# Patient Record
Sex: MALE | Race: WHITE
[De-identification: names, ages, dates, MRNs, and addresses within clinical notes are randomized per-mention and may not be internally consistent; named-entity substitution may affect disease eponyms.]

---

## 2017-04-04 NOTE — EDM.PDOC
ED HPI Trauma





- General


Chief Complaint: Lower Extremity Injury/Pain


Stated Complaint: FOOT


Time Seen by Provider: 04/04/17 22:05


Source: Reports: Patient


History Limitations: Reports: No limitations





- History of Present Illness


INITIAL COMMENTS - FREE TEXT/NARRATIVE: 





This 58 yo male patient reports to the ED with swelling in his left foot. The 

patient reports he has a history of gout and has had similar gout attacks in 

the past. The patient ran out of Indomethacin. The patient reports that he 

currently does not have a primary care provider and is not on any medications 

for managing his gout. The patient reports he recently ate beans, which may 

have precipitated the attack. 


Symptom Onset Date: 04/03/17


Occurred Where: other


Method of Injury: other


Severity: moderate


Pain/Injury Location: Reports: lower extremity, left


Consciousness: Reports: no loss of consciousness


Associated Symptoms: Reports: no other symptoms


Allergies/ADRs: 


Allergies





No Known Allergies Allergy (Verified 04/04/17 21:40)


 








Home Medications: 


Ambulatory Orders





Indomethacin [Indocin] 2 tab PO DAILY 06/24/15 [Confirmed 04/04/17]











Past Medical History





- Past Health History


Medical/Surgical History: Denies Medical/Surgical History


Other Gastrointestinal History: hepatitis C


Musculoskeletal History: Reports: Gout





Social & Family History





- Tobacco Use


Smoking Status *Q: Current Every Day Smoker


Years of Tobacco use: 30


Packs/Tins Daily: 1


Used Tobacco, but Quit: No


Second Hand Smoke Exposure: Yes





- Caffeine Use


Caffeine Use: Reports: Coffee





- Alcohol Use


Days Per Week of Alcohol Use: 7


Number of Drinks Per Day: 2


Total Drinks Per Week: 14





- Recreational Drug Use


Recreational Drug Use: No





Review of Systems





- Review of Systems


Review Of Systems: ROS reveals no pertinent complaints other than HPI.





Trauma Exam





- Physical Exam


Exam: See Below


Exam Limited By: No limitations


General Appearance: Reports: alert, WD/WN


Head: Reports: atraumatic, normocephalic


Eyes: bilateral eye: EOMI, normal inspection, PERRL


Ears: Reports: normal external exam, normal canal, hearing grossly normal, 

normal TMs


Nose: Reports: normal inspection, normal mucousa, no blood


Throat/Mouth: Reports: Normal inspection, Normal lips, Normal teeth, Normal gums

, Normal oropharynx, Normal voice, No airway compromise


Neck: Reports: non-tender, full range of motion, normal alignment, normal 

inspection


Respiratory Exam: Reports: no respiratory distress, lungs clear, normal breath 

sounds


Cardiovascular: Reports: normal peripheral pulses, regular rate, rhythm, no 

edema, no gallop, no JVD, no murmur, no rub


GI/Abdominal: Reports: normal bowel sounds, soft, non tender, no organomegaly, 

no distention, no abnormal bruit, no mass


 (Male) Exam: Deferred


Rectal (Males) Exam: Deferred


Back: Reports: full range of motion, normal inspection, non-tender


Extremities: Reports: pedal edema (left foot and ankle)


Neurologic: Reports: CNs II-XII nml as tested, no motor/sensory deficits, alert

, normal mood/affect, oriented x 3


Skin: Reports: Normal color, Warm/dry





- Ria Coma Score


Best Eye Response (Dix): (4) open spontaneously


Best Verbal Response (Ria): (5) oriented


Best Motor Response (Ria): (6) obeys commands


Dix Total: 15





Course





- Vital Signs


Last Recorded V/S: 


 Last Vital Signs











Temp  36.0 C   04/04/17 21:37


 


Pulse  91   04/04/17 21:37


 


Resp  20   04/04/17 21:37


 


BP  163/91 H  04/04/17 21:37


 


Pulse Ox  100   04/04/17 21:37














- Orders/Labs/Meds


Meds: 


Medications














Discontinued Medications














Generic Name Dose Route Start Last Admin





  Trade Name Jaxson  PRN Reason Stop Dose Admin


 


Indomethacin  50 mg  04/04/17 22:10  





  Indocin  PO  04/04/17 22:11  





  ONETIME ONE   














Departure





- Departure


Time of Disposition: 22:12


Disposition: Home, Self-Care 01


Condition: fair


Clinical Impression: 


Gout attack


Qualifiers:


 Gout site: foot Gout etiology: unspecified cause Laterality: left Qualified 

Code(s): M10.9 - Gout, unspecified





Instructions:  Gout, Easy-to-Read


Forms:  ED Department Discharge


Care Plan Goals: 


The patient was advised of the examination results during the visit. The 

patient was given an oral dose of Indomethacin (50 mg) while in the ED. The 

patient was discharged with a script for Indomethacin (50 mg) #20 to take 1 by 

mouth 3 times per day. The patient should follow-up with a primary care 

facility for continued evaluation and treatment. If the patient has any 

additional symptoms or concerns, the patient should visit his primary care 

facility or return to the emergency department.

## 2017-09-19 NOTE — EDM.PDOC
ED HPI GENERAL MEDICAL PROBLEM





- General


Chief Complaint: Lower Extremity Injury/Pain


Stated Complaint: RT ANKEL/FOOT. BROKE OR GOUT


Time Seen by Provider: 09/19/17 12:50


Source of Information: Reports: Patient


History Limitations: Reports: No Limitations





- History of Present Illness


INITIAL COMMENTS - FREE TEXT/NARRATIVE: 


Patient presents to the ER with complaints of right foot and ankle pain. He 

reports that the pain has been ongoing for the past three days. The pain 

started originally after falling 2-3 feet of a ladder. He reports he fell flat 

onto his heel and heard a "crunch." The pain has since worsened and is much 

more severe this morning. He rates the pain 10/10, described as throbbing and 

stabbing. He reports that the pain is typical to gout attacks in the past. He 

denies any other associated symptoms. Patient states that in the past the only 

thing that has worked for his gout pain is indomethacin.


  ** Right Feet


Pain Score (Numeric/FACES): 10





- Related Data


 Allergies











Allergy/AdvReac Type Severity Reaction Status Date / Time


 


No Known Allergies Allergy   Verified 09/19/17 12:10











Home Meds: 


 Home Meds





Indomethacin [Indocin] 2 tab PO DAILY 06/24/15 [History]











Past Medical History





- Past Health History


Medical/Surgical History: Denies Medical/Surgical History


HEENT History: Reports: Impaired Vision


Other Gastrointestinal History: hepatitis C


Musculoskeletal History: Reports: Gout





Social & Family History





- Family History


Family Medical History: Noncontributory





- Tobacco Use


Smoking Status *Q: Current Every Day Smoker


Years of Tobacco use: 40


Packs/Tins Daily: 0.5


Used Tobacco, but Quit: No


Second Hand Smoke Exposure: Yes





- Caffeine Use


Caffeine Use: Reports: Coffee, Energy Drinks, Soda, Tea





- Alcohol Use


Days Per Week of Alcohol Use: 7


Number of Drinks Per Day: 2


Total Drinks Per Week: 14


Date of Last Drink: 09/18/17





- Recreational Drug Use


Recreational Drug Use: No





Review of Systems





- Review of Systems


Review Of Systems: ROS reveals no pertinent complaints other than HPI.





ED EXAM, GENERAL





- Physical Exam


Exam: See Below


Exam Limited By: Altered Mental Status


General Appearance: Alert, WD/WN, No Apparent Distress


Respiratory/Chest: No Respiratory Distress, Lungs Clear, Normal Breath Sounds, 

No Accessory Muscle Use, Chest Non-Tender


Cardiovascular: Normal Peripheral Pulses, Regular Rate, Rhythm, No Edema, No 

Gallop, No JVD, No Murmur, No Rub


GI/Abdominal: Normal Bowel Sounds, Soft, Non-Tender, No Organomegaly, No 

Distention, No Abnormal Bruit, No Mass


 (Male) Exam: Deferred


Rectal (Males) Exam: Deferred


Back Exam: Normal Inspection, Full Range of Motion, NT


Extremities: Normal Inspection, Normal Range of Motion, Normal Capillary Refill

, Joint Swelling, Redness (redness and swelling to right foot)


Neurological: Alert, Oriented, CN II-XII Intact, Normal Cognition, Normal Gait, 

Normal Reflexes, No Motor/Sensory Deficits


Skin Exam: Warm, Dry, Intact, Normal Color, No Rash


Lymphatic: No Adenopathy





Course





- Vital Signs


Last Recorded V/S: 


 Last Vital Signs











Temp  36.6 C   09/19/17 12:11


 


Pulse  77   09/19/17 12:11


 


Resp  18   09/19/17 12:11


 


BP  107/64   09/19/17 12:11


 


Pulse Ox  99   09/19/17 12:11














- Orders/Labs/Meds


Labs: 


 Laboratory Tests











  09/19/17 09/19/17 Range/Units





  12:55 12:55 


 


WBC  14.2 H   (5.0-10.0)  10^3/uL


 


RBC  4.54 L   (4.6-6.2)  10^6/uL


 


Hgb  15.1   (14.0-18.0)  g/dL


 


Hct  43.5   (40.0-54.0)  %


 


MCV  95.8   ()  fL


 


MCH  33.3   (27.0-34.0)  pg


 


MCHC  34.7   (33.0-35.0)  g/dL


 


Plt Count  274   (150-450)  10^3/uL


 


Neut % (Auto)  66.6   (42.2-75.2)  %


 


Lymph % (Auto)  19.3 L   (20.5-50.1)  %


 


Mono % (Auto)  13.2 H   (2-8)  %


 


Eos % (Auto)  0.5 L   (1.0-3.0)  %


 


Baso % (Auto)  0.4   (0.0-1.0)  %


 


Sodium   136  (135-145)  mmol/L


 


Potassium   4.1  (3.6-5.0)  mmol/L


 


Chloride   99 L  (101-111)  mmol/L


 


Carbon Dioxide   24.0  (21.0-31.0)  mmol/L


 


Anion Gap   17.1  


 


BUN   14  (7-18)  mg/dL


 


Creatinine   0.9  (0.6-1.3)  mg/dL


 


Est Cr Clr Drug Dosing   99.40  mL/min


 


Estimated GFR (MDRD)   > 60  


 


BUN/Creatinine Ratio   15.55  


 


Glucose   125 H  ()  mg/dL


 


Uric Acid   8.0 H  (2.6-7.2)  mg/dL


 


Calcium   9.2  (8.4-10.2)  mg/dl


 


Total Bilirubin   1.2 H  (0.2-1.0)  mg/dL


 


AST   19  (10-42)  IU/L


 


ALT   16  (10-60)  IU/L


 


Alkaline Phosphatase   84  ()  IU/L


 


Total Protein   8.0  (6.7-8.2)  g/dl


 


Albumin   4.3  (3.2-5.5)  g/dl


 


Globulin   3.7  


 


Albumin/Globulin Ratio   1.16  











Meds: 


Medications














Discontinued Medications














Generic Name Dose Route Start Last Admin





  Trade Name Uvaldoq  PRN Reason Stop Dose Admin


 


Indomethacin  50 mg  09/19/17 13:37  09/19/17 13:45





  Indocin  PO  09/19/17 13:38  50 mg





  ONETIME ONE   Administration














Departure





- Departure


Time of Disposition: 15:23


Disposition: Home, Self-Care 01


Condition: Fair


Clinical Impression: 


Gout attack


Qualifiers:


 Gout site: foot Gout etiology: unspecified cause Laterality: left Qualified 

Code(s): M10.9 - Gout, unspecified





Right ankle sprain


Qualifiers:


 Encounter type: initial encounter Involved ligament of ankle: unspecified 

ligament Qualified Code(s): S93.401A - Sprain of unspecified ligament of right 

ankle, initial encounter





Gout


Qualifiers:


 Gout site: foot Gout etiology: unspecified cause Chronicity: acute Laterality: 

right Qualified Code(s): M10.9 - Gout, unspecified








- Discharge Information


Instructions:  Ankle Sprain, Easy-to-Read, Gout


Forms:  ED Department Discharge


Care Plan Goals: 


Patient was given 50 mg indomethacin in the ER with improvement in pain. 

Discussed slightly elevated uric acid level and xray results. Discussed that 

patient likely also sprained of his right ankle. Advised him to rest, ice, and 

elevate it. Patient prescribed 50 mg indomethacin PO TID x 10 days. Instructed 

patient to follow up with his primary care provider or return to the ER if 

symptoms worsen or do not improve.

## 2018-01-19 NOTE — EDM.PDOC
ED HPI GENERAL MEDICAL PROBLEM





- General


Chief Complaint: Lower Extremity Injury/Pain


Stated Complaint: gout [phone]


Time Seen by Provider: 01/19/18 19:11


Source of Information: Reports: Patient


History Limitations: Reports: No Limitations





- History of Present Illness


INITIAL COMMENTS - FREE TEXT/NARRATIVE: 





gives long h/o gout and indocin work good but out of it. flared up 2 days ago 

and getting worse. doesn't have primary care. 


  ** Right Feet


Pain Score (Numeric/FACES): 6





- Related Data


 Allergies











Allergy/AdvReac Type Severity Reaction Status Date / Time


 


No Known Allergies Allergy   Verified 01/19/18 16:54











Home Meds: 


 Home Meds





Indomethacin [Indocin] 2 tab PO DAILY 06/24/15 [History]











Past Medical History





- Past Health History


Medical/Surgical History: Denies Medical/Surgical History


HEENT History: Reports: Impaired Vision


Cardiovascular History: Reports: None


Respiratory History: Reports: None


Gastrointestinal History: Reports: Other (See Below)


Other Gastrointestinal History: hepatitis C


Genitourinary History: Reports: None


Musculoskeletal History: Reports: Gout


Neurological History: Reports: None


Psychiatric History: Reports: None


Endocrine/Metabolic History: Reports: None


Hematologic History: Reports: None


Immunologic History: Reports: None


Oncologic (Cancer) History: Reports: None


Dermatologic History: Reports: None





- Infectious Disease History


Infectious Disease History: Reports: Hepatitis C





- Past Surgical History


Cardiovascular Surgical History: Reports: None


Respiratory Surgical History: Reports: None


GI Surgical History: Reports: None


Male  Surgical History: Reports: None


Endocrine Surgical History: Reports: None


Oncologic Surgical History: Reports: None


Dermatological Surgical History: Reports: None





Social & Family History





- Family History


Family Medical History: Noncontributory





- Tobacco Use


Smoking Status *Q: Current Every Day Smoker


Years of Tobacco use: 40


Packs/Tins Daily: 1


Used Tobacco, but Quit: No


Second Hand Smoke Exposure: Yes





- Caffeine Use


Caffeine Use: Reports: Coffee, Energy Drinks, Soda, Tea





- Alcohol Use


Days Per Week of Alcohol Use: 7


Number of Drinks Per Day: 5


Total Drinks Per Week: 35





- Recreational Drug Use


Recreational Drug Use: No





Review of Systems





- Review of Systems


Review Of Systems: ROS reveals no pertinent complaints other than HPI.





ED EXAM, GENERAL





- Physical Exam


Exam: See Below


Exam Limited By: No Limitations


General Appearance: Alert, WD/WN, Mild Distress, Moderate Distress, Other (foot 

pain)


Ears: Hearing Grossly Normal


Throat/Mouth: Normal Voice, No Airway Compromise


Head: Atraumatic


Neck: Non-Tender, Full Range of Motion


Respiratory/Chest: No Respiratory Distress


Cardiovascular: Regular Rate, Rhythm


GI/Abdominal: Soft, Non-Tender


Extremities: Joint Swelling, Limited Range of Motion, Increased Warmth, Redness

, Other (right foot inflammation, NV wnl, gait limited to pain)


Neurological: Alert, Normal Cognition, No Motor/Sensory Deficits


Psychiatric: Tearful


Skin Exam: Warm, Dry, Normal Color


Lymphatic: No Adenopathy





Course





- Vital Signs


Last Recorded V/S: 


 Last Vital Signs











Temp  36.8 C   01/19/18 18:56


 


Pulse  77   01/19/18 18:56


 


Resp  18   01/19/18 18:56


 


BP  123/81   01/19/18 18:56


 


Pulse Ox  100   01/19/18 18:56














- Orders/Labs/Meds


Meds: 


Medications














Discontinued Medications














Generic Name Dose Route Start Last Admin





  Trade Name Freq  PRN Reason Stop Dose Admin


 


Indomethacin  50 mg  01/19/18 19:10  01/19/18 19:14





  Indocin  PO  01/19/18 19:11  50 mg





  ONETIME ONE   Administration


 


Ketorolac Tromethamine  15 mg  01/19/18 19:31  01/19/18 19:39





  Toradol  IVPUSH  01/19/18 19:32  15 mg





  ONETIME ONE   Administration














- Re-Assessments/Exams


Free Text/Narrative Re-Assessment/Exam: 





01/19/18 19:50


s/p IV toradol = much better but not 100% yet.





Departure





- Departure


Time of Disposition: 19:51


Disposition: Home, Self-Care 01


Condition: Good


Clinical Impression: 


Acute gout


Qualifiers:


 Gout site: foot Gout etiology: unspecified cause Laterality: right Qualified 

Code(s): M10.9 - Gout, unspecified








- Discharge Information


Instructions:  Gout, Easy-to-Read


Forms:  ED Department Discharge


Additional Instructions: 


1) elevate leg as much as possible next 48 hours


2) try hot soaks


3) recheck as needed





rx given;


indocin 50mg bid x 60

## 2018-09-23 NOTE — EDM.PDOC
ED HPI GENERAL MEDICAL PROBLEM





- General


Chief Complaint: Lower Extremity Injury/Pain


Stated Complaint: LEFT FOOT, GOUT, NO MEDS


Time Seen by Provider: 09/23/18 16:51


Source of Information: Reports: Patient, RN, RN Notes Reviewed


History Limitations: Reports: No Limitations





- History of Present Illness


INITIAL COMMENTS - FREE TEXT/NARRATIVE: 


Patient presented to ER with complaint of gout flare up. This began yesterday 

a.m. on his left foot. Pain is 9/10. 


Onset Date: 09/22/18


Duration: Getting Worse


Location: Reports: Lower Extremity, Left


Quality: Reports: Ache


Severity: Moderate


Improves with: Reports: None


Worsens with: Reports: None


Associated Symptoms: Reports: No Other Symptoms


  ** Left Feet


Pain Score (Numeric/FACES): 9





- Related Data


 Allergies











Allergy/AdvReac Type Severity Reaction Status Date / Time


 


No Known Allergies Allergy   Verified 09/23/18 15:36











Home Meds: 


 Home Meds





Indomethacin [Indocin] 2 tab PO DAILY 06/24/15 [History]











Past Medical History





- Past Health History


Medical/Surgical History: Denies Medical/Surgical History


HEENT History: Reports: Impaired Vision


Cardiovascular History: Reports: None


Respiratory History: Reports: None


Gastrointestinal History: Reports: Other (See Below)


Other Gastrointestinal History: hepatitis C


Genitourinary History: Reports: None


Musculoskeletal History: Reports: Arthritis, Back Pain, Chronic, Gout


Neurological History: Reports: None


Psychiatric History: Reports: None


Endocrine/Metabolic History: Reports: None


Hematologic History: Reports: None


Immunologic History: Reports: None


Oncologic (Cancer) History: Reports: None


Dermatologic History: Reports: None





- Infectious Disease History


Infectious Disease History: Reports: Hepatitis C





- Past Surgical History


Cardiovascular Surgical History: Reports: None


Respiratory Surgical History: Reports: None


GI Surgical History: Reports: None


Male  Surgical History: Reports: None


Endocrine Surgical History: Reports: None


Oncologic Surgical History: Reports: None


Dermatological Surgical History: Reports: None





Social & Family History





- Family History


Family Medical History: Noncontributory





- Tobacco Use


Smoking Status *Q: Current Every Day Smoker


Years of Tobacco use: 40


Packs/Tins Daily: 1


Second Hand Smoke Exposure: Yes





- Caffeine Use


Caffeine Use: Reports: Coffee, Energy Drinks, Soda, Tea





- Recreational Drug Use


Recreational Drug Use: No





Review of Systems





- Review of Systems


Review Of Systems: ROS reveals no pertinent complaints other than HPI.





ED EXAM, GENERAL





- Physical Exam


Exam: See Below


Exam Limited By: No Limitations


General Appearance: Alert, WD/WN, No Apparent Distress


Eye Exam: Bilateral Eye: Normal Inspection


Ears: Normal External Exam, Normal Canal, Hearing Grossly Normal, Normal TMs


Nose: Normal Inspection, Normal Mucosa, No Blood


Throat/Mouth: Normal Inspection, Normal Lips, Normal Teeth, Normal Gums, Normal 

Oropharynx, Normal Voice, No Airway Compromise


Head: Atraumatic, Normocephalic


Neck: Normal Inspection, Supple, Non-Tender, Full Range of Motion


Respiratory/Chest: Rhonchi (throughout)


Cardiovascular: Normal Peripheral Pulses, Regular Rate, Rhythm, No Edema, No 

Gallop, No JVD, No Murmur, No Rub


GI/Abdominal: Normal Bowel Sounds, Soft, Non-Tender, No Organomegaly, No 

Distention, No Abnormal Bruit, No Mass


 (Male) Exam: Deferred


Rectal (Males) Exam: Deferred


Back Exam: Normal Inspection, Full Range of Motion, NT


Extremities: Other (left foot decreased range of motion, tender and 

erythematous.)


Neurological: Alert, Oriented, CN II-XII Intact, Normal Cognition, Normal Gait, 

Normal Reflexes, No Motor/Sensory Deficits


Psychiatric: Normal Affect, Normal Mood


Skin Exam: Warm, Dry, Intact, Normal Color, No Rash


Lymphatic: No Adenopathy





Course





- Vital Signs


Last Recorded V/S: 


 Last Vital Signs











Temp  96.8 F   09/23/18 15:33


 


Pulse  100   09/23/18 15:33


 


Resp  18   09/23/18 15:33


 


BP  129/98 H  09/23/18 15:33


 


Pulse Ox  100   09/23/18 15:33














- Orders/Labs/Meds


Labs: 


 Laboratory Tests











  09/23/18 09/23/18 Range/Units





  15:50 15:50 


 


WBC  13.4 H   (5.0-10.0)  10^3/uL


 


RBC  4.76   (4.6-6.2)  10^6/uL


 


Hgb  15.6   (14.0-18.0)  g/dL


 


Hct  45.3   (40.0-54.0)  %


 


MCV  95.2   ()  fL


 


MCH  32.8   (27.0-34.0)  pg


 


MCHC  34.4   (33.0-35.0)  g/dL


 


Plt Count  249   (150-450)  10^3/uL


 


Neut % (Auto)  64.7   (42.2-75.2)  %


 


Lymph % (Auto)  23.5   (20.5-50.1)  %


 


Mono % (Auto)  11.2 H   (2-8)  %


 


Eos % (Auto)  0.3 L   (1.0-3.0)  %


 


Baso % (Auto)  0.3   (0.0-1.0)  %


 


Uric Acid   8.5 H  (2.6-7.2)  mg/dL











Meds: 


Medications














Discontinued Medications














Generic Name Dose Route Start Last Admin





  Trade Name Freq  PRN Reason Stop Dose Admin


 


Colchicine  1.8 mg  09/23/18 16:56  09/23/18 17:03





  Colcrys  PO  09/23/18 16:57  1.8 mg





  ONETIME ONE   Administration





     





     





     





     


 


Ibuprofen  800 mg  09/23/18 16:56  09/23/18 17:03





  Motrin  PO  09/23/18 16:57  800 mg





  ONETIME ONE   Administration





     





     





     





     


 


Prednisone  40 mg  09/23/18 16:57  09/23/18 17:03





  Prednisone  PO  09/23/18 16:58  40 mg





  ONETIME ONE   Administration





     





     





     





     














Departure





- Departure


Time of Disposition: 17:02


Disposition: Home, Self-Care 01


Condition: Fair


Clinical Impression: 


Gout attack


Qualifiers:


 Gout site: foot Gout etiology: unspecified cause Laterality: right Qualified 

Code(s): M10.9 - Gout, unspecified








- Discharge Information


*PRESCRIPTION DRUG MONITORING PROGRAM REVIEWED*: No


*COPY OF PRESCRIPTION DRUG MONITORING REPORT IN PATIENT PITO: No


Instructions:  Gout, Easy-to-Read


Forms:  ED Department Discharge


Additional Instructions: 


Continue using ibuprofen as directed for pain


RX: Prednisone


Follow up with your primary care facility

## 2019-01-14 NOTE — EDM.PDOC
ED HPI GENERAL MEDICAL PROBLEM





- General


Chief Complaint: Lower Extremity Injury/Pain


Stated Complaint: GAL BLADDER FLARE UP


Time Seen by Provider: 01/14/19 10:33


Source of Information: Reports: Patient


History Limitations: Reports: No Limitations





- History of Present Illness


INITIAL COMMENTS - FREE TEXT/NARRATIVE: 





Patient comes emergency department today with complaints of bilateral foot pain 

and a gout flareup. Patient has had long-standing problems with gout. He has 

been out of his allopurinol as well as his indomethacin for quite some time. He 

does have a primary care provider but he has not seen them in the last month 

when his gout is gotten much worse. He has not been on any prednisone for about 

6 months and that was for his hip. He is unsure of how his kidney function is 

or if he is a diabetic. No recent injury to his lower extremities. His left 

foot is much worse than his right. It is very hypersensitive and swollen. It is 

difficult for him to put his shoes on.


  ** Bilateral Lower Ankle


Pain Score (Numeric/FACES): 8





- Related Data


 Allergies











Allergy/AdvReac Type Severity Reaction Status Date / Time


 


No Known Allergies Allergy   Verified 01/14/19 10:11











Home Meds: 


 Home Meds





Allopurinol [Zyloprim] 100 mg PO DAILY #30 tab 01/14/19 [Rx]


Indomethacin [Indocin] 1 tab PO TID PRN #25 cap 01/14/19 [Rx]


Omeprazole 20 mg PO DAILY #30 tab. 01/14/19 [Rx]


predniSONE 40 mg PO .Daily Taper #5 tab 01/14/19 [Rx]











Past Medical History





- Past Health History


Medical/Surgical History: Denies Medical/Surgical History


HEENT History: Reports: Impaired Vision


Cardiovascular History: Reports: None


Respiratory History: Reports: None


Gastrointestinal History: Reports: Other (See Below)


Other Gastrointestinal History: hepatitis C


Genitourinary History: Reports: None


Musculoskeletal History: Reports: Arthritis, Back Pain, Chronic, Gout


Neurological History: Reports: None


Psychiatric History: Reports: None


Endocrine/Metabolic History: Reports: None


Hematologic History: Reports: None


Immunologic History: Reports: None


Oncologic (Cancer) History: Reports: None


Dermatologic History: Reports: None





- Infectious Disease History


Infectious Disease History: Reports: Hepatitis C





- Past Surgical History


Head Surgeries/Procedures: Reports: None


Cardiovascular Surgical History: Reports: None


Respiratory Surgical History: Reports: None


GI Surgical History: Reports: None


Male  Surgical History: Reports: None


Endocrine Surgical History: Reports: None


Oncologic Surgical History: Reports: None


Dermatological Surgical History: Reports: None





Social & Family History





- Family History


Family Medical History: Noncontributory





- Tobacco Use


Smoking Status *Q: Current Every Day Smoker


Years of Tobacco use: 50


Packs/Tins Daily: 0.7





- Caffeine Use


Caffeine Use: Reports: Coffee





- Alcohol Use


Days Per Week of Alcohol Use: 2


Number of Drinks Per Day: 6


Total Drinks Per Week: 12





- Recreational Drug Use


Recreational Drug Use: No





Review of Systems





- Review of Systems


Review Of Systems: ROS reveals no pertinent complaints other than HPI.





ED EXAM, GENERAL





- Physical Exam


Exam: See Below


Free Text/Narrative:: 





He does appear quite uncomfortable in pain.


General Appearance: Alert


Respiratory/Chest: No Respiratory Distress, Lungs Clear


Cardiovascular: Normal Peripheral Pulses, Regular Rate, Rhythm


Peripheral Pulses: 2+: Popliteal (L), Popliteal (R), Posterior Tibial (L), 

Posterior Tibial (R), Dorsalis Pedis (L), Dorsalis Pedis (R)


GI/Abdominal: Normal Bowel Sounds, Soft


Extremities: Normal Range of Motion, Normal Capillary Refill, Other (He does 

have some mild swelling to the left base of the big toe as well as some mild 

erythema the swelling extends throughout most of the dorsum of the foot. No 

acute bony abnormality or break in the skin. Similarly on the right foot he has 

some generalized dorsal foot swelling at the base of the right big toe not as 

impressive as the left. No breaks in the skin. He is quite hypersensitive 

throughout the entirety of bilateral lower feet.)


Neurological: Alert, Oriented


Psychiatric: Normal Affect, Normal Mood


Skin Exam: Warm, Dry, Intact, Other (As above)





Course





- Vital Signs


Last Recorded V/S: 


 Last Vital Signs











Temp  36.6 C   01/14/19 10:11


 


Pulse  77   01/14/19 10:11


 


Resp  18   01/14/19 10:11


 


BP  119/80   01/14/19 10:11


 


Pulse Ox  98   01/14/19 10:11














- Orders/Labs/Meds


Labs: 


 Laboratory Tests











  01/14/19 Range/Units





  10:46 


 


Sodium  135  (135-145)  mmol/L


 


Potassium  4.0  (3.6-5.0)  mmol/L


 


Chloride  99 L  (101-111)  mmol/L


 


Carbon Dioxide  25.0  (21.0-31.0)  mmol/L


 


Anion Gap  15.0  


 


BUN  10  (7-18)  mg/dL


 


Creatinine  0.8  (0.6-1.3)  mg/dL


 


Est Cr Clr Drug Dosing  105.89  mL/min


 


Estimated GFR (MDRD)  > 60  


 


Glucose  109 H  ()  mg/dL


 


Uric Acid  6.3  (2.6-7.2)  mg/dL


 


Calcium  9.1  (8.4-10.2)  mg/dl











Meds: 


Medications














Discontinued Medications














Generic Name Dose Route Start Last Admin





  Trade Name Freq  PRN Reason Stop Dose Admin


 


Hydrocodone Bitart/Acetaminophen  1 tab  01/14/19 10:37  01/14/19 11:10





  Norco 325-5 Mg  PO  01/14/19 10:38  Not Given





  ONETIME ONE   





     





     





     





     


 


Hydrocodone Bitart/Acetaminophen  1 tab  01/14/19 10:38  01/14/19 10:49





  Norco 325-10 Mg  PO  01/14/19 10:39  1 tab





  ONETIME ONE   Administration





     





     





     





     


 


Indomethacin  50 mg  01/14/19 10:40  01/14/19 10:49





  Indocin  PO  01/14/19 10:41  50 mg





  ONETIME ONE   Administration





     





     





     





     














- Re-Assessments/Exams


Free Text/Narrative Re-Assessment/Exam: 





01/14/19 11:45


He was given hydrocodone as well as indomethacin while in the emergency parent.





His laboratory evaluation is rather unremarkable he is unsure of what his 

kidney status was her diabetes status. His uric acid level was normal





I will send him home with some prednisone indomethacin allopurinol hydrocodone 

will also give some omeprazole to protect his stomach but this aggressive 

therapy as above. I did discuss his chronic management things need to be taken 

care of in the primary care clinic. He is understanding of this and his 

questions are answered.





Departure





- Departure


Time of Disposition: 11:26


Disposition: Home, Self-Care 01


Clinical Impression: 


Gout attack


Qualifiers:


 Gout site: foot Gout etiology: unspecified cause Laterality: left Qualified 

Code(s): M10.9 - Gout, unspecified








- Discharge Information


Prescriptions: 


Allopurinol [Zyloprim] 100 mg PO DAILY #30 tab


Indomethacin [Indocin] 1 tab PO TID PRN #25 cap


 PRN Reason: Other


Omeprazole 20 mg PO DAILY #30 tab.rap


predniSONE 40 mg PO .Daily Taper #5 tab


Instructions:  Gout, Easy-to-Read, Pain Medicine Instructions, Easy-to-Read


Forms:  ED Department Discharge


Additional Instructions: 


Idomethacin, 1 tablet three times a day as needed for pain. Do not take with 

other NSAIDs Ibuprofen Aleve Motrin etc. RX given to the patient. 


Prednisone 40mg a day for the next 5 days. RX given to the patient. 


Allopurinol 100mg a day. RX given to the patient. 


make sure you take the above medications on a full stomach. 


You must get your long term medications out of the clinic. 


Please see your PCP in the next week for long term management of your chronic 

gout. 


If pain not controlled with above Norco 1 tablet every 4 hrs as needed for 

pain. RX given to the patient. 


Return to the ED if new or worsening diet. 


Limit foods high in purine. 


Lots of fluids over the next few days. 


Omeprazole daily to protect your stomach with all the above medications. 





- Assessment/Plan


Assessment:: 





Acute on chronic gouty arthritis bilaterally. 


Plan: 





Idomethacin, 1 tablet three times a day as needed for pain. Do not take with 

other NSAIDs Ibuprofen Aleve Motrin etc. RX given to the patient. 


Prednisone 40mg a day for the next 5 days. RX given to the patient. 


Allopurinol 100mg a day. RX given to the patient. 


make sure you take the above medications on a full stomach. 


You must get your long term medications out of the clinic. 


Please see your PCP in the next week for long term management of your chronic 

gout. 


If pain not controlled with above Norco 1 tablet every 4 hrs as needed for 

pain. RX given to the patient. 


Return to the ED if new or worsening diet. 


Limit foods high in purine. 


Lots of fluids over the next few days. 


Omeprazole daily to protect the stomach as well.

## 2020-10-07 ENCOUNTER — HOSPITAL ENCOUNTER (EMERGENCY)
Dept: HOSPITAL 43 - DL.ED | Age: 60
Discharge: HOME | End: 2020-10-07
Payer: COMMERCIAL

## 2020-10-07 VITALS — HEART RATE: 89 BPM | DIASTOLIC BLOOD PRESSURE: 78 MMHG | SYSTOLIC BLOOD PRESSURE: 165 MMHG

## 2020-10-07 DIAGNOSIS — Z79.899: ICD-10-CM

## 2020-10-07 DIAGNOSIS — M10.061: Primary | ICD-10-CM

## 2020-10-07 LAB
ANION GAP SERPL CALC-SCNC: 20.5 MEQ/L (ref 7–13)
CHLORIDE SERPL-SCNC: 97 MMOL/L (ref 98–107)
SODIUM SERPL-SCNC: 136 MMOL/L (ref 136–145)

## 2020-10-07 PROCEDURE — 85025 COMPLETE CBC W/AUTO DIFF WBC: CPT

## 2020-10-07 PROCEDURE — 84550 ASSAY OF BLOOD/URIC ACID: CPT

## 2020-10-07 PROCEDURE — 86140 C-REACTIVE PROTEIN: CPT

## 2020-10-07 PROCEDURE — 80053 COMPREHEN METABOLIC PANEL: CPT

## 2020-10-07 PROCEDURE — 96375 TX/PRO/DX INJ NEW DRUG ADDON: CPT

## 2020-10-07 PROCEDURE — 96361 HYDRATE IV INFUSION ADD-ON: CPT

## 2020-10-07 PROCEDURE — 96374 THER/PROPH/DIAG INJ IV PUSH: CPT

## 2020-10-07 PROCEDURE — 36415 COLL VENOUS BLD VENIPUNCTURE: CPT

## 2020-10-07 PROCEDURE — 99283 EMERGENCY DEPT VISIT LOW MDM: CPT

## 2020-10-07 PROCEDURE — 85379 FIBRIN DEGRADATION QUANT: CPT

## 2020-10-07 NOTE — EDM.PDOC
ED HPI GENERAL MEDICAL PROBLEM





- General


Chief Complaint: Lower Extremity Injury/Pain


Stated Complaint: RIGHT KNEE SWELLING AFFECTING MOBILITY


Time Seen by Provider: 10/07/20 10:43


Source of Information: Reports: Patient, Old Records, RN, RN Notes Reviewed


History Limitations: Reports: No Limitations





- History of Present Illness


INITIAL COMMENTS - FREE TEXT/NARRATIVE: 


Pt to ER, unable to bear weight on right knee. Started Saturday (4 days ago) 

with a right ankle gout flare, which has since resolved leaving some fading 

ecchymosis to the right foot. Monday morning (3 days ago) he awoke with severe 

right knee pain. The joint is edematous but not ecchymotic or erythematous. He 

denies any fever or chills. States that he has been taking extra Allopurinol, as

he usually takes Indomethacin for attacks but did not have any.





Onset: Gradual


Duration: Constant, Getting Worse


Location: Reports: Lower Extremity, Right


Quality: Reports: Ache, Same as Previous Episode, Sharp, Throbbing


Severity: Severe


Improves with: Reports: Immobilization


Worsens with: Reports: Movement


Associated Symptoms: Reports: No Other Symptoms


  ** Right Knee


Pain Score (Numeric/FACES): 8





- Related Data


                                    Allergies











Allergy/AdvReac Type Severity Reaction Status Date / Time


 


No Known Allergies Allergy   Verified 10/07/20 10:50











Home Meds: 


                                    Home Meds





Indomethacin [Indocin] 1 tab PO TID PRN #25 cap 01/14/19 [Rx]


Omeprazole 20 mg PO DAILY #30 tab. 01/14/19 [Rx]


allopurinoL [Zyloprim] 100 mg PO DAILY #30 tab 01/14/19 [Rx]


Metoprolol Tartrate [Lopressor] 12.5 mg PO BID 10/07/20 [History]











Past Medical History





- Past Health History


Medical/Surgical History: Denies Medical/Surgical History


HEENT History: Reports: Impaired Vision


Cardiovascular History: Reports: None


Respiratory History: Reports: None


Gastrointestinal History: Reports: Other (See Below)


Other Gastrointestinal History: hepatitis C


Genitourinary History: Reports: None


Musculoskeletal History: Reports: Arthritis, Back Pain, Chronic, Gout


Neurological History: Reports: None


Psychiatric History: Reports: None


Endocrine/Metabolic History: Reports: None


Hematologic History: Reports: None


Immunologic History: Reports: None


Oncologic (Cancer) History: Reports: None


Dermatologic History: Reports: None





- Infectious Disease History


Infectious Disease History: Reports: Hepatitis C





- Past Surgical History


Head Surgeries/Procedures: Reports: None


Cardiovascular Surgical History: Reports: None


Respiratory Surgical History: Reports: None


GI Surgical History: Reports: None


Male  Surgical History: Reports: None


Endocrine Surgical History: Reports: None


Oncologic Surgical History: Reports: None


Dermatological Surgical History: Reports: None





Social & Family History





- Family History


Family Medical History: Noncontributory





- Caffeine Use


Caffeine Use: Reports: Coffee





- Living Situation & Occupation


Living situation: Reports: with Family


Occupation: Disabled





Review of Systems





- Review of Systems


Review Of Systems: Comprehensive ROS is negative, except as noted in HPI.





ED EXAM, GENERAL





- Physical Exam


Exam: See Below


Exam Limited By: No Limitations


General Appearance: Alert, WD/WN, No Apparent Distress


Eye Exam: Bilateral Eye: Normal Inspection


Nose: Normal Inspection


Throat/Mouth: Normal Inspection, Normal Lips, Normal Voice, No Airway Compromise


Head: Atraumatic, Normocephalic


Neck: Normal Inspection


Respiratory/Chest: No Respiratory Distress


Cardiovascular: Normal Peripheral Pulses


GI/Abdominal: Normal Bowel Sounds, Soft, Non-Tender


Back Exam: Normal Inspection


Extremities: No Pedal Edema, Joint Swelling (Right knee with generalized 

tenderness, increased warmth, but no erythema, anterior/patellar bursa swelling 

and joint effusion), Other (Left knee and all other joints normal to exam).  No:

 Petrona's Sign, Mottled, Redness


Neurological: Alert, Oriented, No Motor/Sensory Deficits


Psychiatric: Normal Affect, Normal Mood


Skin Exam: Warm, Dry, Intact, Normal Color, No Rash





Course





- Vital Signs


Last Recorded V/S: 


                                Last Vital Signs











Temp  97.8 F   10/07/20 10:44


 


Pulse  89   10/07/20 10:44


 


Resp  16   10/07/20 10:44


 


BP  165/78 H  10/07/20 10:44


 


Pulse Ox  98   10/07/20 10:44














- Orders/Labs/Meds


Orders: 


                               Active Orders 24 hr











 Category Date Time Status


 


 Peripheral IV Care [RC] .AS DIRECTED Care  10/07/20 11:13 Active


 


 Sodium Chloride 0.9% [Normal Saline] 1,000 ml Med  10/07/20 11:13 Active





 IV .BOLUS   


 


 Sodium Chloride 0.9% [Saline Flush] Med  10/07/20 11:12 Active





 10 ml FLUSH ASDIRECTED PRN   


 


 Peripheral IV Insertion Adult [OM.PC] Stat Oth  10/07/20 11:12 Ordered








                                Medication Orders





Sodium Chloride (Normal Saline)  1,000 mls @ 999 mls/hr IV .BOLUS ONE


   Stop: 10/07/20 12:13


   Last Admin: 10/07/20 11:32  Dose: 999 mls/hr


   Documented by: ARUN


Sodium Chloride (Saline Flush)  10 ml FLUSH ASDIRECTED PRN


   PRN Reason: Keep Vein Open


   Last Admin: 10/07/20 11:36  Dose: 10 ml


   Documented by: ARUN








Labs: 


                                Laboratory Tests











  10/07/20 10/07/20 10/07/20 Range/Units





  10:49 10:49 10:49 


 


WBC  11.5 H    (5.0-10.0)  10^3/uL


 


RBC  4.40 L    (4.6-6.2)  10^6/uL


 


Hgb  15.0    (14.0-18.0)  g/dL


 


Hct  43.2    (40.0-54.0)  %


 


MCV  98.2  D    ()  fL


 


MCH  34.1 H    (27.0-34.0)  pg


 


MCHC  34.7    (33.0-35.0)  g/dL


 


Plt Count  234    (150-450)  10^3/uL


 


Neut % (Auto)  70.5    (42.2-75.2)  %


 


Lymph % (Auto)  17.6 L    (20.5-50.1)  %


 


Mono % (Auto)  10.5 H    (2-8)  %


 


Eos % (Auto)  0.4 L    (1.0-3.0)  %


 


Baso % (Auto)  1.0    (0.0-1.0)  %


 


Add Manual Diff  Yes    


 


Neutrophils % (Manual)  72    (42-75)  %


 


Band Neutrophils %  3    %


 


Lymphocytes % (Manual)  18 L    (20-50)  %


 


Monocytes % (Manual)  7    (2-8)  %


 


D-Dimer, Quantitative   751 H   (0-400)  ng/mL


 


Sodium    136  (136-145)  mmol/L


 


Potassium    3.5  (3.5-5.1)  mmol/L


 


Chloride    97 L  ()  mmol/L


 


Carbon Dioxide    22  (21-32)  mmol/L


 


Anion Gap    20.5 H  (7-13)  mEq/L


 


BUN    14  (7-18)  mg/dL


 


Creatinine    1.17  (0.70-1.30)  mg/dL


 


Est Cr Clr Drug Dosing    73.69  mL/min


 


Estimated GFR (MDRD)    > 60  


 


BUN/Creatinine Ratio    12.0  (No establ ref range)  


 


Glucose    115 H  (74-99)  mg/dL


 


Uric Acid    5.6  (3.5-7.2)  mg/dL


 


Calcium    8.9  (8.5-10.1)  mg/dL


 


Total Bilirubin    1.1 H  (0.2-1.0)  mg/dL


 


AST    32  (15-37)  U/L


 


ALT    34  (16-63)  U/L


 


Alkaline Phosphatase    92  ()  U/L


 


C-Reactive Protein    21.9 H  (0.0-0.9)  mg/dL


 


Total Protein    8.3 H  (6.4-8.2)  g/dL


 


Albumin    3.3 L  (3.4-5.0)  g/dL


 


Globulin    5.0  


 


Albumin/Globulin Ratio    0.66  











Meds: 


Medications











Generic Name Dose Route Start Last Admin





  Trade Name Freq  PRN Reason Stop Dose Admin


 


Sodium Chloride  1,000 mls @ 999 mls/hr  10/07/20 11:13  10/07/20 11:32





  Normal Saline  IV  10/07/20 12:13  999 mls/hr





  .BOLUS ONE   Administration


 


Sodium Chloride  10 ml  10/07/20 11:12  10/07/20 11:36





  Saline Flush  FLUSH   10 ml





  ASDIRECTED PRN   Administration





  Keep Vein Open  














Discontinued Medications














Generic Name Dose Route Start Last Admin





  Trade Name Freq  PRN Reason Stop Dose Admin


 


Colchicine  1.2 mg  10/07/20 11:13  10/07/20 11:35





  Colcrys  PO  10/07/20 11:14  1.2 mg





  ONETIME ONE   Administration


 


Hydromorphone HCl  1 mg  10/07/20 11:12  10/07/20 11:34





  Dilaudid  IVPUSH  10/07/20 11:13  1 mg





  ONETIME ONE   Administration


 


Ketorolac Tromethamine  30 mg  10/07/20 11:12  10/07/20 11:36





  Toradol  IVPUSH  10/07/20 11:13  30 mg





  ONETIME ONE   Administration


 


Methylprednisolone Sodium Succinate  125 mg  10/07/20 11:12  10/07/20 11:37





  Solu-Medrol  IVPUSH  10/07/20 11:13  125 mg





  ONETIME ONE   Administration


 


Ondansetron HCl  4 mg  10/07/20 11:13  10/07/20 11:32





  Zofran  IV  10/07/20 11:14  4 mg





  ONETIME ONE   Administration














Departure





- Departure


Time of Disposition: 12:08


Disposition: Home, Self-Care 01


Condition: Good


Clinical Impression: 


Gout of right knee


Qualifiers:


 Gout etiology: idiopathic Chronicity: acute Qualified Code(s): M10.061 - 

Idiopathic gout, right knee








- Discharge Information


*PRESCRIPTION DRUG MONITORING PROGRAM REVIEWED*: No


*COPY OF PRESCRIPTION DRUG MONITORING REPORT IN PATIENT PITO: No


Instructions:  Low-Purine Eating Plan


Forms:  ED Department Discharge


Additional Instructions: 


Rx: Prednisone 20mg


Rx: Indomethacin 50mg


Rx: Colchicine 0.6mg


Rx: Norco 5mg/325mg


Moist hot packs to right knee.


Use crutches as needed for comfort.


Follow up in clinic next week.





Sepsis Event Note (ED)





- Focused Exam


Vital Signs: 


                                   Vital Signs











  Temp Pulse Resp BP Pulse Ox


 


 10/07/20 10:44  97.8 F  89  16  165/78 H  98














- My Orders


Last 24 Hours: 


My Active Orders





10/07/20 11:12


Sodium Chloride 0.9% [Saline Flush]   10 ml FLUSH ASDIRECTED PRN 


Peripheral IV Insertion Adult [OM.PC] Stat 





10/07/20 11:13


Peripheral IV Care [RC] .AS DIRECTED 


Sodium Chloride 0.9% [Normal Saline] 1,000 ml IV .BOLUS 














- Assessment/Plan


Last 24 Hours: 


My Active Orders





10/07/20 11:12


Sodium Chloride 0.9% [Saline Flush]   10 ml FLUSH ASDIRECTED PRN 


Peripheral IV Insertion Adult [OM.PC] Stat 





10/07/20 11:13


Peripheral IV Care [RC] .AS DIRECTED 


Sodium Chloride 0.9% [Normal Saline] 1,000 ml IV .BOLUS

## 2021-06-01 ENCOUNTER — HOSPITAL ENCOUNTER (EMERGENCY)
Dept: HOSPITAL 43 - DL.ED | Age: 61
Discharge: HOME | End: 2021-06-01
Payer: COMMERCIAL

## 2021-06-01 VITALS — SYSTOLIC BLOOD PRESSURE: 163 MMHG | DIASTOLIC BLOOD PRESSURE: 92 MMHG | HEART RATE: 112 BPM

## 2021-06-01 DIAGNOSIS — M10.9: Primary | ICD-10-CM

## 2021-06-01 DIAGNOSIS — Z79.899: ICD-10-CM

## 2021-06-01 DIAGNOSIS — Z72.0: ICD-10-CM

## 2021-06-01 LAB
ANION GAP SERPL CALC-SCNC: 19.5 MEQ/L (ref 7–13)
CHLORIDE SERPL-SCNC: 99 MMOL/L (ref 98–107)
SODIUM SERPL-SCNC: 136 MMOL/L (ref 136–145)

## 2021-06-01 NOTE — EDM.PDOC
ED HPI GENERAL MEDICAL PROBLEM





- General


Chief Complaint: Upper Extremity Injury/Pain


Stated Complaint: INFECTION IN ELBOW


Time Seen by Provider: 06/01/21 07:01


Source of Information: Reports: Patient, Old Records, RN, RN Notes Reviewed


History Limitations: Reports: No Limitations





- History of Present Illness


INITIAL COMMENTS - FREE TEXT/NARRATIVE: 


Pt presents to ER from home by POV with c/o three days duration of worsening 

left posterior elbow pain, swelling, and redness with increased warmth. Denie 

injury, fever, chills, or overuse. He has history of elbow bursitis, but it has 

never hurt this bad. He has Hx of gout, and takes allopurinol daily. Pt states 

even his shirt touching his elbow hurts. He is nauseated from the pain. Denies 

COVID symptoms or exposure. 





Onset: Gradual


Duration: Day(s): (3), Constant, Getting Worse


Location: Reports: Upper Extremity, Left


Quality: Reports: Ache, Throbbing


Severity: Severe


Improves with: Reports: None


Worsens with: Reports: Other (Touch/Palpation), Movement


Associated Symptoms: Reports: No Other Symptoms


  ** Left Elbow


Pain Score (Numeric/FACES): 5





- Related Data


                                    Allergies











Allergy/AdvReac Type Severity Reaction Status Date / Time


 


No Known Allergies Allergy   Verified 06/01/21 06:48











Home Meds: 


                                    Home Meds





allopurinoL [Zyloprim] 100 mg PO DAILY #30 tab 01/14/19 [Rx]











Past Medical History





- Past Health History


Medical/Surgical History: Denies Medical/Surgical History


HEENT History: Reports: Impaired Vision


Cardiovascular History: Reports: None


Respiratory History: Reports: None


Gastrointestinal History: Reports: Other (See Below)


Other Gastrointestinal History: hepatitis C


Genitourinary History: Reports: None


Musculoskeletal History: Reports: Arthritis, Back Pain, Chronic, Gout


Neurological History: Reports: None


Psychiatric History: Reports: None


Endocrine/Metabolic History: Reports: None


Hematologic History: Reports: None


Immunologic History: Reports: None


Oncologic (Cancer) History: Reports: None


Dermatologic History: Reports: None





- Infectious Disease History


Infectious Disease History: Reports: Hepatitis C





- Past Surgical History


Head Surgeries/Procedures: Reports: None


Cardiovascular Surgical History: Reports: None


Respiratory Surgical History: Reports: None


GI Surgical History: Reports: None


Male  Surgical History: Reports: None


Endocrine Surgical History: Reports: None


Oncologic Surgical History: Reports: None


Dermatological Surgical History: Reports: None





Social & Family History





- Family History


Family Medical History: No Pertinent Family History





- Tobacco Use


Tobacco Use Status *Q: Current Every Day Tobacco User


Tobacco Use Within Last Twelve Months: Cigarettes


Years of Tobacco use: 45


Packs/Tins Daily: 1





- Caffeine Use


Caffeine Use: Reports: Coffee





- Alcohol Use


Days Per Week of Alcohol Use: 5


Number of Drinks Per Day: 5


Total Drinks Per Week: 25





- Recreational Drug Use


Recreational Drug Use: No





- Living Situation & Occupation


Living situation: Reports: with Family


Occupation: Disabled





Review of Systems





- Review of Systems


Review Of Systems: Comprehensive ROS is negative, except as noted in HPI.





ED EXAM, GENERAL





- Physical Exam


Exam: See Below


Exam Limited By: No Limitations


General Appearance: Alert, WD/WN, No Apparent Distress


Throat/Mouth: Normal Voice, No Airway Compromise


Head: Atraumatic, Normocephalic


Neck: Normal Inspection, Full Range of Motion


Respiratory/Chest: No Respiratory Distress


Cardiovascular: Normal Peripheral Pulses, Regular Rate, Rhythm, Tachycardia


Peripheral Pulses: 3+: Radial (L), Radial (R)


Back Exam: Normal Inspection


Extremities: Arm Pain (Left olecranon elbow acutely tender with erythema, mild 

soft tissue swelling, no obvious bursa fluid collection, slight increased 

warmth, painful ROM, skin is intact. No bruising or sign of injury.)


Neurological: Alert, Oriented, No Motor/Sensory Deficits


Psychiatric: Normal Affect, Normal Mood


Skin Exam: Warm, Dry, Intact





Course





- Vital Signs


Last Recorded V/S: 


                                Last Vital Signs











Temp  96.2 F L  06/01/21 06:45


 


Pulse  112 H  06/01/21 06:45


 


Resp  16   06/01/21 06:45


 


BP  163/92 H  06/01/21 06:45


 


Pulse Ox  98   06/01/21 06:45














- Orders/Labs/Meds


Orders: 


                               Active Orders 24 hr











 Category Date Time Status


 


 Peripheral IV Care [RC] .AS DIRECTED Care  06/01/21 07:09 Active


 


 Colchicine [Colcrys] Med  06/01/21 08:05 Once





 1.2 mg PO ONETIME ONE   


 


 Sodium Chloride 0.9% [Saline Flush] Med  06/01/21 07:09 Active





 10 ml FLUSH ASDIRECTED PRN   


 


 methylPREDNISolone Sod Succ [Solu-MEDROL] Med  06/01/21 08:05 Once





 125 mg IVPUSH ONETIME ONE   


 


 Peripheral IV Insertion Adult [OM.PC] Stat Oth  06/01/21 07:09 Ordered








                                Medication Orders





Sodium Chloride (Sodium Chloride 0.9% 10 Ml Syringe)  10 ml FLUSH ASDIRECTED PRN


   PRN Reason: Keep Vein Open


   Last Admin: 06/01/21 07:22  Dose: 10 ml


   Documented by: FRANK








Labs: 


                                Laboratory Tests











  06/01/21 06/01/21 06/01/21 Range/Units





  07:20 07:20 07:20 


 


WBC  10.2 H    (5.0-10.0)  10^3/uL


 


RBC  4.55 L    (4.6-6.2)  10^6/uL


 


Hgb  15.8    (14.0-18.0)  g/dL


 


Hct  44.9    (40.0-54.0)  %


 


MCV  98.7    ()  fL


 


MCH  34.7 H    (27.0-34.0)  pg


 


MCHC  35.2 H    (33.0-35.0)  g/dL


 


Plt Count  273    (150-450)  10^3/uL


 


Neut % (Auto)  62.5    (42.2-75.2)  %


 


Lymph % (Auto)  24.6    (20.5-50.1)  %


 


Mono % (Auto)  11.3 H    (2-8)  %


 


Eos % (Auto)  0.4 L    (1.0-3.0)  %


 


Baso % (Auto)  1.2 H    (0.0-1.0)  %


 


Sodium   136   (136-145)  mmol/L


 


Potassium   4.5   (3.5-5.1)  mmol/L


 


Chloride   99   ()  mmol/L


 


Carbon Dioxide   22   (21-32)  mmol/L


 


Anion Gap   19.5 H   (7-13)  mEq/L


 


BUN   5 L   (7-18)  mg/dL


 


Creatinine   1.06   (0.70-1.30)  mg/dL


 


Est Cr Clr Drug Dosing   80.32   mL/min


 


Estimated GFR (MDRD)   > 60   


 


BUN/Creatinine Ratio   4.7   (No establ ref range)  


 


Glucose   157 H   (70-99)  mg/dL


 


Lactic Acid    1.9  (0.4-2.0)  mmol/L


 


Uric Acid   7.9 H   (3.5-7.2)  mg/dL


 


Calcium   8.5   (8.5-10.1)  mg/dL


 


Total Bilirubin   0.8   (0.2-1.0)  mg/dL


 


AST   31   (15-37)  U/L


 


ALT   57   (16-63)  U/L


 


Alkaline Phosphatase   111   ()  U/L


 


C-Reactive Protein   6.1 H   (0.0-0.9)  mg/dL


 


Total Protein   8.0   (6.4-8.2)  g/dL


 


Albumin   3.4   (3.4-5.0)  g/dL


 


Globulin   4.6   


 


Albumin/Globulin Ratio   0.7   











Meds: 


Medications











Generic Name Dose Route Start Last Admin





  Trade Name Freq  PRN Reason Stop Dose Admin


 


Sodium Chloride  10 ml  06/01/21 07:09  06/01/21 07:22





  Sodium Chloride 0.9% 10 Ml Syringe  FLUSH   10 ml





  ASDIRECTED PRN   Administration





  Keep Vein Open  














Discontinued Medications














Generic Name Dose Route Start Last Admin





  Trade Name Freq  PRN Reason Stop Dose Admin


 


Hydromorphone HCl  1 mg  06/01/21 07:10  06/01/21 07:24





  Hydromorphone 1 Mg/Ml Syringe  IVPUSH  06/01/21 07:11  1 mg





  ONETIME ONE   Administration


 


Methylprednisolone Sodium Succinate  125 mg  06/01/21 08:05 





  Methylprednisolone Sodium Succinate 125 Mg/2 Ml Sdv  IVPUSH  06/01/21 08:06 





  ONETIME ONE  


 


Ondansetron HCl  4 mg  06/01/21 07:09  06/01/21 07:22





  Ondansetron 4 Mg/2 Ml Sdv  IV  06/01/21 07:10  4 mg





  ONETIME ONE   Administration














- Re-Assessments/Exams


Free Text/Narrative Re-Assessment/Exam: 





06/01/21 08:06


Exam, Hx, and lab results are consistent with gout of the elbow.





Departure





- Departure


Time of Disposition: 08:06


Disposition: Home, Self-Care 01


Condition: Good


Clinical Impression: 


 Acute gout of left elbow








- Discharge Information


*PRESCRIPTION DRUG MONITORING PROGRAM REVIEWED*: No


*COPY OF PRESCRIPTION DRUG MONITORING REPORT IN PATIENT PITO: No


Instructions:  Low-Purine Eating Plan


Forms:  ED Department Discharge


Additional Instructions: 


Rx: Prednisone 20mg  *Take with meals.


Rx: Hydrocodone APAP 5mg/325mg  *Do not drive or work while under the influence 

of this medication.


Try to quit smoking, as smoking increases gout risk and makes it harder to 

treat.


Moist hot packs to left elbow.


Follow up in clinic in 5 to 7 days for recheck if needed.





Sepsis Event Note (ED)





- Evaluation


Sepsis Screening Result: No Definite Risk





- Focused Exam


Vital Signs: 


                                   Vital Signs











  Temp Pulse Resp BP Pulse Ox


 


 06/01/21 06:45  96.2 F L  112 H  16  163/92 H  98














- My Orders


Last 24 Hours: 


My Active Orders





06/01/21 07:09


Peripheral IV Care [RC] .AS DIRECTED 


Sodium Chloride 0.9% [Saline Flush]   10 ml FLUSH ASDIRECTED PRN 


Peripheral IV Insertion Adult [OM.PC] Stat 





06/01/21 08:05


Colchicine [Colcrys]   1.2 mg PO ONETIME ONE 


methylPREDNISolone Sod Succ [Solu-MEDROL]   125 mg IVPUSH ONETIME ONE 














- Assessment/Plan


Last 24 Hours: 


My Active Orders





06/01/21 07:09


Peripheral IV Care [RC] .AS DIRECTED 


Sodium Chloride 0.9% [Saline Flush]   10 ml FLUSH ASDIRECTED PRN 


Peripheral IV Insertion Adult [OM.PC] Stat 





06/01/21 08:05


Colchicine [Colcrys]   1.2 mg PO ONETIME ONE 


methylPREDNISolone Sod Succ [Solu-MEDROL]   125 mg IVPUSH ONETIME ONE

## 2021-06-19 ENCOUNTER — HOSPITAL ENCOUNTER (EMERGENCY)
Dept: HOSPITAL 43 - DL.ED | Age: 61
Discharge: HOME | End: 2021-06-19
Payer: COMMERCIAL

## 2021-06-19 VITALS — SYSTOLIC BLOOD PRESSURE: 146 MMHG | HEART RATE: 95 BPM | DIASTOLIC BLOOD PRESSURE: 82 MMHG

## 2021-06-19 DIAGNOSIS — M10.062: Primary | ICD-10-CM

## 2021-06-19 DIAGNOSIS — Z72.0: ICD-10-CM

## 2021-06-19 LAB
ANION GAP SERPL CALC-SCNC: 22.3 MEQ/L (ref 7–13)
CHLORIDE SERPL-SCNC: 100 MMOL/L (ref 98–107)
SODIUM SERPL-SCNC: 137 MMOL/L (ref 136–145)

## 2021-06-19 NOTE — EDM.PDOC
ED HPI GENERAL MEDICAL PROBLEM





- General


Chief Complaint: Lower Extremity Injury/Pain


Stated Complaint: GOUT


Time Seen by Provider: 06/19/21 08:55





- History of Present Illness


INITIAL COMMENTS - FREE TEXT/NARRATIVE: 


Marshall is a 61-year-old man who presents with a recurrent episode of gout in 

his right knee.  Marshall has had issues with gout in the past, normally is on 

allopurinol.  He states he has not been on that medication in quite some time.  

He had a significant flareup of gout at the beginning of this month involving 

his elbow.  Now he reports having significant pain in his right knee.  There is 

been no redness, he has had no fevers or chills, no redness of any other part of

the leg.





  ** Right Knee


Pain Score (Numeric/FACES): 5





- Related Data


                                    Allergies











Allergy/AdvReac Type Severity Reaction Status Date / Time


 


No Known Allergies Allergy   Verified 06/01/21 06:48











Home Meds: 


                                    Home Meds





Indomethacin 25 mg PO TID 5 Days #15 capsule 06/19/21 [Rx]


Indomethacin [Indocin] 25 mg PO TID 06/19/21 [History]


allopurinoL [Zyloprim] 100 mg PO DAILY #30 tab 06/19/21 [Rx]











Past Medical History





- Past Health History


Medical/Surgical History: Denies Medical/Surgical History


HEENT History: Reports: Impaired Vision


Cardiovascular History: Reports: None


Respiratory History: Reports: None


Gastrointestinal History: Reports: Other (See Below)


Other Gastrointestinal History: hepatitis C


Genitourinary History: Reports: None


Musculoskeletal History: Reports: Arthritis, Back Pain, Chronic, Gout


Neurological History: Reports: None


Psychiatric History: Reports: None


Endocrine/Metabolic History: Reports: None


Hematologic History: Reports: None


Immunologic History: Reports: None


Oncologic (Cancer) History: Reports: None


Dermatologic History: Reports: None





- Infectious Disease History


Infectious Disease History: Reports: Hepatitis C





- Past Surgical History


Head Surgeries/Procedures: Reports: None


Cardiovascular Surgical History: Reports: None


Respiratory Surgical History: Reports: None


GI Surgical History: Reports: None


Male  Surgical History: Reports: None


Endocrine Surgical History: Reports: None


Oncologic Surgical History: Reports: None


Dermatological Surgical History: Reports: None





Social & Family History





- Family History


Family Medical History: No Pertinent Family History





- Tobacco Use


Tobacco Use Status *Q: Current Every Day Tobacco User


Years of Tobacco use: 50


Packs/Tins Daily: 1.5





- Caffeine Use


Caffeine Use: Reports: None





- Recreational Drug Use


Recreational Drug Use: No





- Living Situation & Occupation


Living situation: Reports: with Family


Occupation: Disabled





Review of Systems





- Review of Systems


Review Of Systems: See Below (See HPI)





ED EXAM, GENERAL





- Physical Exam


Exam: See Below


Free Text/Narrative:: 


General: Marshall is a 61-year-old man in no acute distress


Oropharynx is clear, mucous membranes are moist


Right knee: He has significant swelling of that knee compared to the left.  

There is no significant ecchymosis or erythema noted, he has fairly good range 

of motion, albeit fairly painful.





CBC and basic metabolic panel were both normal.  Uric acid was still slightly 

elevated, but actually better than his previous values





Peripheral IV was started, he was given 30 mg of IV Toradol as well as 0.5 mg of

 IV hydromorphone.  After 1/2-hour, he was feeling much better.








Course





- Vital Signs


Last Recorded V/S: 


                                Last Vital Signs











Temp  95.8 F L  06/19/21 09:01


 


Pulse  95   06/19/21 09:15


 


Resp  20   06/19/21 09:01


 


BP  146/82 H  06/19/21 09:15


 


Pulse Ox  98   06/19/21 09:01














- Orders/Labs/Meds


Orders: 


                               Active Orders 24 hr











 Category Date Time Status


 


 CBC WITH AUTO DIFF [HEME] Stat Lab  06/19/21 09:01 Results


 


 MANUAL DIFFERENTIAL QA/NC [HEME] Stat Lab  06/19/21 09:01 Results











Labs: 


                                Laboratory Tests











  06/19/21 06/19/21 Range/Units





  09:01 09:01 


 


WBC  13.7 H   (5.0-10.0)  10^3/uL


 


RBC  4.59 L   (4.6-6.2)  10^6/uL


 


Hgb  16.0   (14.0-18.0)  g/dL


 


Hct  43.7   (40.0-54.0)  %


 


MCV  95.2  D   ()  fL


 


MCH  34.9 H   (27.0-34.0)  pg


 


MCHC  36.6 H   (33.0-35.0)  g/dL


 


Plt Count  372  D   (150-450)  10^3/uL


 


Neut % (Auto)  62.4   (42.2-75.2)  %


 


Lymph % (Auto)  24.2   (20.5-50.1)  %


 


Mono % (Auto)  11.5 H   (2-8)  %


 


Eos % (Auto)  0.5 L   (1.0-3.0)  %


 


Baso % (Auto)  1.4 H   (0.0-1.0)  %


 


Add Manual Diff  Yes   


 


Neutrophils % (Manual)  70   (42-75)  %


 


Band Neutrophils %  1   %


 


Lymphocytes % (Manual)  20   (20-50)  %


 


Monocytes % (Manual)  9 H   (2-8)  %


 


Sodium   137  (136-145)  mmol/L


 


Potassium   4.3  (3.5-5.1)  mmol/L


 


Chloride   100  ()  mmol/L


 


Carbon Dioxide   19 L  (21-32)  mmol/L


 


Anion Gap   22.3 H  (7-13)  mEq/L


 


BUN   4 L  (7-18)  mg/dL


 


Creatinine   1.08  (0.70-1.30)  mg/dL


 


Est Cr Clr Drug Dosing   78.84  mL/min


 


Estimated GFR (MDRD)   > 60  


 


Glucose   159 H  (70-99)  mg/dL


 


Uric Acid   7.2  (3.5-7.2)  mg/dL


 


Calcium   8.9  (8.5-10.1)  mg/dL











Meds: 


Medications














Discontinued Medications














Generic Name Dose Route Start Last Admin





  Trade Name Freq  PRN Reason Stop Dose Admin


 


Hydromorphone HCl  0.5 mg  06/19/21 08:58  06/19/21 09:06





  Hydromorphone 0.5 Mg/0.5 Ml Syringe  IVPUSH  06/19/21 08:59  0.5 mg





  ONETIME ONE   Administration


 


Ketorolac Tromethamine  30 mg  06/19/21 08:57  06/19/21 09:06





  Ketorolac 30 Mg/Ml Sdv  IVPUSH  06/19/21 08:58  30 mg





  ONETIME ONE   Administration














Departure





- Departure


Time of Disposition: 09:51


Disposition: Home, Self-Care 01


Clinical Impression: 


Acute gout


Qualifiers:


 Gout site: foot Gout etiology: unspecified cause Laterality: left Qualified 

Code(s): M10.9 - Gout, unspecified








- Discharge Information


*PRESCRIPTION DRUG MONITORING PROGRAM REVIEWED*: Not Applicable


*COPY OF PRESCRIPTION DRUG MONITORING REPORT IN PATIENT PITO: Not Applicable


Prescriptions: 


Indomethacin 25 mg PO TID 5 Days #15 capsule


allopurinoL [Zyloprim] 100 mg PO DAILY #30 tab


Instructions:  Low-Purine Eating Plan


Forms:  ED Department Discharge





Sepsis Event Note (ED)





- Evaluation


Sepsis Screening Result: No Definite Risk





- Focused Exam


Vital Signs: 


                                   Vital Signs











  Temp Pulse Resp BP Pulse Ox


 


 06/19/21 09:15   95   146/82 H 


 


 06/19/21 09:01  95.8 F L  120 H  20  145/118 H  98














- Problem List & Annotations


(1) Acute gout


SNOMED Code(s): 374348069


   Code(s): M10.9 - GOUT, UNSPECIFIED   Status: Acute   


Qualifiers: 


   Gout site: knee   Laterality: right 





- Problem List Review


Problem List Initiated/Reviewed/Updated: Yes





- My Orders


Last 24 Hours: 


My Active Orders





06/19/21 09:01


CBC WITH AUTO DIFF [HEME] Stat 


MANUAL DIFFERENTIAL QA/NC [HEME] Stat 














- Assessment/Plan


Last 24 Hours: 


My Active Orders





06/19/21 09:01


CBC WITH AUTO DIFF [HEME] Stat 


MANUAL DIFFERENTIAL QA/NC [HEME] Stat 











Assessment:: 


1. Acute gout, R knee





Plan: 


1.  As above, hopefully the Toradol will help him throughout the rest of today. 

 I sent him a prescription for indomethacin, 25 mg 3 times daily to be used as 

needed.


2.  Once this gouty flare has resolved, he will get back on his allopurinol, 100

 mg p.o. daily.  He will follow with his primary care provider if he is not 

improving.

## 2021-06-20 ENCOUNTER — HOSPITAL ENCOUNTER (EMERGENCY)
Dept: HOSPITAL 43 - DL.ED | Age: 61
Discharge: HOME | End: 2021-06-20
Payer: COMMERCIAL

## 2021-06-20 VITALS — DIASTOLIC BLOOD PRESSURE: 84 MMHG | HEART RATE: 96 BPM | SYSTOLIC BLOOD PRESSURE: 136 MMHG

## 2021-06-20 DIAGNOSIS — Z72.0: ICD-10-CM

## 2021-06-20 DIAGNOSIS — M10.061: Primary | ICD-10-CM

## 2021-06-20 DIAGNOSIS — J44.9: ICD-10-CM

## 2021-06-20 NOTE — EDM.PDOC
ED HPI GENERAL MEDICAL PROBLEM





- General


Chief Complaint: Lower Extremity Injury/Pain


Stated Complaint: RT KNEE PAIN


Time Seen by Provider: 06/20/21 08:00





- History of Present Illness


INITIAL COMMENTS - FREE TEXT/NARRATIVE: 


Marshall is a 61-year-old man who presents again back to the ER with severe pain 

in his right knee.  Marshall has a history of severe gout, but unfortunately has 

not been on allopurinol recently as he ran out of that prescription.  He 

developed severe pain and swelling in his right knee 2 days ago, and has been 

struggling with this since.  He was seen in the ER yesterday, and given IV 

Toradol as well as pain medication.  He was sent home on indomethacin, but he 

was not yet able to  that prescription.





  ** Right Knee


Pain Score (Numeric/FACES): 7





- Related Data


                                    Allergies











Allergy/AdvReac Type Severity Reaction Status Date / Time


 


No Known Allergies Allergy   Verified 06/20/21 07:33











Home Meds: 


                                    Home Meds





Indomethacin 25 mg PO TID 5 Days #15 capsule 06/19/21 [Rx]


Indomethacin [Indocin] 25 mg PO TID 06/19/21 [History]


allopurinoL [Zyloprim] 100 mg PO DAILY #30 tab 06/19/21 [Rx]


Hydrocodone/Acetaminophen [Hydrocodone-Acetamin  mg] 0.5 - 1 tab PO Q8H 

PRN 3 Days #9 tablet 06/20/21 [Rx]











Past Medical History





- Past Health History


Medical/Surgical History: Denies Medical/Surgical History


HEENT History: Reports: Impaired Vision


Cardiovascular History: Reports: None


Respiratory History: Reports: COPD


Gastrointestinal History: Reports: Other (See Below)


Other Gastrointestinal History: hepatitis C


Genitourinary History: Reports: None


Musculoskeletal History: Reports: Arthritis, Back Pain, Chronic, Gout


Neurological History: Reports: None


Psychiatric History: Reports: None


Endocrine/Metabolic History: Reports: None


Hematologic History: Reports: None


Immunologic History: Reports: None


Oncologic (Cancer) History: Reports: None


Dermatologic History: Reports: None





- Infectious Disease History


Infectious Disease History: Reports: Hepatitis C





- Past Surgical History


Head Surgeries/Procedures: Reports: None


Cardiovascular Surgical History: Reports: None


Respiratory Surgical History: Reports: None


GI Surgical History: Reports: None


Male  Surgical History: Reports: None


Endocrine Surgical History: Reports: None


Oncologic Surgical History: Reports: None


Dermatological Surgical History: Reports: None





Social & Family History





- Family History


Family Medical History: No Pertinent Family History





- Tobacco Use


Tobacco Use Status *Q: Current Every Day Tobacco User


Years of Tobacco use: 30


Packs/Tins Daily: 0.5





- Caffeine Use


Caffeine Use: Reports: None





- Living Situation & Occupation


Living situation: Reports: with Family


Occupation: Disabled





Review of Systems





- Review of Systems


Review Of Systems: Comprehensive ROS is negative, except as noted in HPI.





ED EXAM, GENERAL





- Physical Exam


Exam: See Below


Free Text/Narrative:: 


General: Marshall is a 61-year-old man in no acute distress


Oropharynx is clear, mucous membranes are moist


Heart: Regular rate and rhythm, no murmurs





Right knee: It is still significantly swollen compared to the left.  There is 

some mild erythema, but no significant induration or signs of fluctuance.





Peripheral IV was started, he was given a total of 1 mg of IV hydromorphone over

 about 2-hour period of time.  He was also given 30 mg of IV Toradol, and 40 mg 

of IV methylprednisolone.  He was then able to successfully ambulate and was 

discharged home.





Course





- Vital Signs


Last Recorded V/S: 


                                Last Vital Signs











Temp  96.5 F L  06/20/21 07:30


 


Pulse  96   06/20/21 07:30


 


Resp  24 H  06/20/21 07:30


 


BP  136/84   06/20/21 07:30


 


Pulse Ox  96   06/20/21 07:30














- Orders/Labs/Meds


Meds: 


Medications














Discontinued Medications














Generic Name Dose Route Start Last Admin





  Trade Name Jaxson  PRN Reason Stop Dose Admin


 


Hydrocodone Bitart/Acetaminophen  Confirm  06/20/21 10:53 





  Acetaminophen/Hydrocodone 325-5 Mg Tab  Administered  06/20/21 10:54 





  Dose  





  2 tab  





  .ROUTE  





  .STK-MED ONE  


 


Hydrocodone Bitart/Acetaminophen  2 tab  06/20/21 07:19 





  Acetaminophen/Hydrocodone 325-5 Mg Tab  PO  06/20/21 07:20 





  .STK-MED ONE  


 


Hydromorphone HCl  0.5 mg  06/20/21 07:30  06/20/21 07:41





  Hydromorphone 0.5 Mg/0.5 Ml Syringe  IVPUSH  06/20/21 07:31  0.5 mg





  ONETIME ONE   Administration


 


Hydromorphone HCl  0.5 mg  06/20/21 09:52  06/20/21 10:03





  Hydromorphone 0.5 Mg/0.5 Ml Syringe  IVPUSH  06/20/21 09:53  0.5 mg





  ONETIME ONE   Administration


 


Lactated Ringer's  1,000 mls @ 999 mls/hr  06/20/21 07:32  06/20/21 07:41





  Ringers, Lactated  IV  06/20/21 08:32  999 mls/hr





  .BOLUS ONE   Administration


 


Indomethacin  Confirm  06/20/21 10:09 





  Indomethacin 25 Mg Cap  Administered  06/20/21 10:10 





  Dose  





  75 mg  





  .ROUTE  





  .STK-MED ONE  


 


Indomethacin  25 mg  06/20/21 07:19 





  Indomethacin 25 Mg Cap  PO  06/20/21 07:20 





  .STK-MED ONE  


 


Ketorolac Tromethamine  30 mg  06/20/21 07:30  06/20/21 07:41





  Ketorolac 30 Mg/Ml Sdv  IVPUSH  06/20/21 07:31  30 mg





  ONETIME ONE   Administration


 


Lidocaine  700 mg  06/20/21 08:40  06/20/21 08:52





  Lidocaine 5% 700 Mg Patch  TRDERM  06/20/21 08:41  700 mg





  ONETIME ONE   Administration


 


Methylprednisolone Sodium Succinate  40 mg  06/20/21 07:37  06/20/21 07:46





  Methylprednisolone Sodium Succinate 40 Mg/1 Ml Sdv  IVPUSH  06/20/21 07:38  40

 mg





  ONETIME ONE   Administration














Departure





- Departure


Time of Disposition: 10:45


Disposition: Home, Self-Care 01


Clinical Impression: 


Gout of right knee


Qualifiers:


 Encounter type: subsequent encounter Chronicity: acute 








- Discharge Information


*PRESCRIPTION DRUG MONITORING PROGRAM REVIEWED*: Yes


*COPY OF PRESCRIPTION DRUG MONITORING REPORT IN PATIENT PITO: No


Prescriptions: 


Hydrocodone/Acetaminophen [Hydrocodone-Acetamin  mg] 0.5 - 1 tab PO Q8H 

PRN 3 Days #9 tablet


 PRN Reason: Pain


Instructions:  Low-Purine Eating Plan


Referrals: 


PCP,None [Primary Care Provider] - 


Forms:  ED Department Discharge





Sepsis Event Note (ED)





- Evaluation


Sepsis Screening Result: No Definite Risk





- Problem List & Annotations


(1) Gout of right knee


SNOMED Code(s): 495444634, 866782250


   Code(s): M10.9 - GOUT, UNSPECIFIED   Status: Acute   


Qualifiers: 


   Encounter type: subsequent encounter   Chronicity: acute 





- Problem List Review


Problem List Initiated/Reviewed/Updated: Yes





- Assessment/Plan


Assessment:: 


1.  61-year-old man with severe acute gout of the right knee





Plan: 


1.  I am hopeful that the combination of the second dose of Toradol as well as 

the dose of IV methylprednisolone today will help decrease the inflammation 

enough to control his pain.  He will start taking the indomethacin this 

afternoon.  Once this acute flare has ceased, he will start taking the 

allopurinol that was given as a prescription to him yesterday


2.  Marshall was also sent home with a prescription for hydrocodone/APAP, 10/325,

1/2 to 1 tablet every 8 hours as needed, #9.  The [place] prescription drug

monitoring program website was checked prior to this prescription being issued, 

Marshall has had no recent prescriptions for narcotics or other controlled 

substances.

## 2021-06-29 ENCOUNTER — HOSPITAL ENCOUNTER (OUTPATIENT)
Dept: HOSPITAL 43 - DL.ED | Age: 61
Setting detail: OBSERVATION
LOS: 1 days | Discharge: HOME | End: 2021-06-30
Attending: INTERNAL MEDICINE | Admitting: INTERNAL MEDICINE
Payer: COMMERCIAL

## 2021-06-29 DIAGNOSIS — E66.9: ICD-10-CM

## 2021-06-29 DIAGNOSIS — J44.9: ICD-10-CM

## 2021-06-29 DIAGNOSIS — Z91.19: ICD-10-CM

## 2021-06-29 DIAGNOSIS — D64.9: ICD-10-CM

## 2021-06-29 DIAGNOSIS — F17.210: ICD-10-CM

## 2021-06-29 DIAGNOSIS — B19.20: ICD-10-CM

## 2021-06-29 DIAGNOSIS — E80.6: ICD-10-CM

## 2021-06-29 DIAGNOSIS — M19.90: ICD-10-CM

## 2021-06-29 DIAGNOSIS — I10: ICD-10-CM

## 2021-06-29 DIAGNOSIS — G89.29: ICD-10-CM

## 2021-06-29 DIAGNOSIS — M10.9: Primary | ICD-10-CM

## 2021-06-29 LAB
ANION GAP SERPL CALC-SCNC: 21.7 MEQ/L (ref 7–13)
CHLORIDE SERPL-SCNC: 96 MMOL/L (ref 98–107)
SODIUM SERPL-SCNC: 137 MMOL/L (ref 136–145)

## 2021-06-29 PROCEDURE — U0002 COVID-19 LAB TEST NON-CDC: HCPCS

## 2021-06-29 PROCEDURE — C9113 INJ PANTOPRAZOLE SODIUM, VIA: HCPCS

## 2021-06-29 PROCEDURE — G0378 HOSPITAL OBSERVATION PER HR: HCPCS

## 2021-06-29 NOTE — PCM.HP
H&P History of Present Illness





- General


Date of Service: 06/29/21


Admit Problem/Dx: 


                           Admission Diagnosis/Problem





Admission Diagnosis/Problem      Gout of multiple sites











- History of Present Illness


Initial Comments - Free Text/Narative: 





The patient is a 61-year-old male who presents to complain of bilateral knee 

pain.  He states that his left knee started hurting 3 days prior to 

hospitalization.  He states that his right knee started hurting approximately 2 

weeks prior to hospitalization.  He states that he also developed left elbow 

pain on June 28, 2021.  Patient [place] fever, rigors, nausea, vomiting, cough, whe

willian, abdominal pain, diarrhea, myalgia, chest pain, dyspnea, or any other 

constitutional complaint.  Patient has known history of gout.  Patient was 

recently seen in the emergency department and treated on outpatient basis but 

appears to have failed outpatient therapy however his compliance is quest

ionable.  He presents for further evaluation


  ** Bilateral Knee


Pain Score (Numeric/FACES): 7





- Related Data


Allergies/Adverse Reactions: 


                                    Allergies











Allergy/AdvReac Type Severity Reaction Status Date / Time


 


No Known Allergies Allergy   Verified 06/29/21 07:05











Home Medications: 


                                    Home Meds





Acetaminophen/HYDROcodone [Norco 325-10 MG] 1 tab PO DAILY PRN 06/29/21 

[History]


Indomethacin 50 mg PO TID 06/29/21 [History]


allopurinoL [Zyloprim] 200 mg PO BID 06/29/21 [History]











Past Medical History





- Past Health History


Medical/Surgical History: Denies Medical/Surgical History


HEENT History: Reports: Impaired Vision


Cardiovascular History: Reports: None


Respiratory History: Reports: COPD


Gastrointestinal History: Reports: Other (See Below)


Other Gastrointestinal History: hepatitis C


Genitourinary History: Reports: None


Musculoskeletal History: Reports: Arthritis, Back Pain, Chronic, Gout


Neurological History: Reports: None


Psychiatric History: Reports: None


Endocrine/Metabolic History: Reports: None


Hematologic History: Reports: None


Immunologic History: Reports: None


Oncologic (Cancer) History: Reports: None


Dermatologic History: Reports: None





- Infectious Disease History


Infectious Disease History: Reports: Hepatitis C





- Past Surgical History


Head Surgeries/Procedures: Reports: None


Cardiovascular Surgical History: Reports: None


Respiratory Surgical History: Reports: None


GI Surgical History: Reports: None


Male  Surgical History: Reports: None


Endocrine Surgical History: Reports: None


Oncologic Surgical History: Reports: None


Dermatological Surgical History: Reports: None





Social & Family History





- Family History


Family Medical History: No Pertinent Family History





- Tobacco Use


Tobacco Use Status *Q: Current Every Day Tobacco User


Years of Tobacco use: 35


Packs/Tins Daily: 1





- Caffeine Use


Caffeine Use: Reports: Coffee





- Recreational Drug Use


Recreational Drug Use: No





- Living Situation & Occupation


Living situation: Reports: with Family


Occupation: Disabled





H&P Review of Systems





- Review of Systems:


Review Of Systems: See Below


General: Reports: No Symptoms


HEENT: Reports: No Symptoms


Pulmonary: Reports: No Symptoms


Cardiovascular: Reports: No Symptoms


Gastrointestinal: Reports: No Symptoms


Genitourinary: Reports: No Symptoms


Musculoskeletal: Reports: Neck Pain, Arm Pain


Skin: Reports: No Symptoms


Psychiatric: Reports: No Symptoms


Neurological: Reports: No Symptoms


Hematologic/Lymphatic: Reports: No Symptoms


Immunologic: Reports: No Symptoms





Exam





- Exam


Exam: See Below





- Vital Signs


Vital Signs: 


                                Last Vital Signs











Temp  96.6 F L  06/29/21 07:00


 


Pulse  98   06/29/21 07:00


 


Resp  16   06/29/21 07:00


 


BP  124/86   06/29/21 07:00


 


Pulse Ox  98   06/29/21 07:00











Weight: 232 lb 9.6 oz





- Exam


General: Alert, Oriented, 4


HEENT: PERRLA, Hearing Intact, Mucosa Moist & Pink, Nares Patent, Normal Nasal 

Septum, Posterior Pharynx Clear, Conjunctiva Clear, EOMI, EACs Clear, TMs Clear


Neck: Supple, Trachea Midline, 2


Lungs: Clear to Auscultation, Normal Respiratory Effort


Cardiovascular: Regular Rate, Regular Rhythm


GI/Abdominal Exam: Normal Bowel Sounds, Soft, Non-Tender, No Organomegaly, No 

Distention, No Abnormal Bruit, No Mass, Pelvis Stable


Back Exam: Normal Inspection, Full Range of Motion, NT


Extremities: Normal Inspection, Normal Range of Motion, Non-Tender, No Pedal 

Edema, Normal Capillary Refill


Peripheral Pulses: 2+: Carotid (L), Carotid (R), Brachial (L), Brachial (R), 

Radial (L), Radial (R), Femoral (L), Femoral (R), Popliteal (L), Popliteal (R), 

Posterior Tibial (L), Posterior Tibial (R), Dorsalis Pedis (L), Dorsalis Pedis 

(R)


Skin: Warm, Dry, Intact


Neurological: Cranial Nerves Intact, Reflexes Equal Bilateral


Neuro Extensive - Mental Status: Alert, Oriented x3, Normal Mood/Affect, Normal 

Cognition


Neuro Extensive - Motor, Sensory, Reflexes: CN II-XII Intact, Normal Gait, 

Normal Reflexes


DTR: 2+: Bicep (L), Bicep (R), Tricep (L), Tricep (R), Patella (L), Patella (R),

 Achilles (L), Achilles (R)


Psychiatric: Alert, Normal Affect, Normal Mood





- Patient Data


Lab Results Last 24 hrs: 


                         Laboratory Results - last 24 hr











  06/29/21 06/29/21 06/29/21 Range/Units





  07:19 07:31 07:31 


 


WBC   13.0 H   (5.0-10.0)  10^3/uL


 


RBC   4.74   (4.6-6.2)  10^6/uL


 


Hgb   16.0   (14.0-18.0)  g/dL


 


Hct   47.0   (40.0-54.0)  %


 


MCV   99.2  D   ()  fL


 


MCH   33.8   (27.0-34.0)  pg


 


MCHC   34.0   (33.0-35.0)  g/dL


 


Plt Count   285  D   (150-450)  10^3/uL


 


Neut % (Auto)   71.4   (42.2-75.2)  %


 


Lymph % (Auto)   12.7 L   (20.5-50.1)  %


 


Mono % (Auto)   15.3 H   (2-8)  %


 


Eos % (Auto)   0.2 L   (1.0-3.0)  %


 


Baso % (Auto)   0.4   (0.0-1.0)  %


 


Add Manual Diff   Yes   


 


Neutrophils % (Manual)   70   (42-75)  %


 


Band Neutrophils %   3   %


 


Lymphocytes % (Manual)   16 L   (20-50)  %


 


Monocytes % (Manual)   11 H   (2-8)  %


 


ESR     (0-15)  mm/hr


 


Sodium    137  (136-145)  mmol/L


 


Potassium    3.7  (3.5-5.1)  mmol/L


 


Chloride    96 L  ()  mmol/L


 


Carbon Dioxide    23  (21-32)  mmol/L


 


Anion Gap    21.7 H  (7-13)  mEq/L


 


BUN    16  (7-18)  mg/dL


 


Creatinine    1.30  (0.70-1.30)  mg/dL


 


Est Cr Clr Drug Dosing    65.50  mL/min


 


Estimated GFR (MDRD)    56  


 


BUN/Creatinine Ratio    12.3  (No establ ref range)  


 


Glucose    133 H  (70-99)  mg/dL


 


POC Glucose  145 H    (70-99)  mg/dL


 


Lactic Acid     (0.4-2.0)  mmol/L


 


Uric Acid     (3.5-7.2)  mg/dL


 


Calcium    9.3  (8.5-10.1)  mg/dL


 


Magnesium     (1.8-2.4)  mg/dL


 


Total Bilirubin    1.2 H  (0.2-1.0)  mg/dL


 


AST    30  (15-37)  U/L


 


ALT    43  (16-63)  U/L


 


Alkaline Phosphatase    111  ()  U/L


 


C-Reactive Protein     (0.0-0.9)  mg/dL


 


Total Protein    8.5 H  (6.4-8.2)  g/dL


 


Albumin    3.2 L  (3.4-5.0)  g/dL


 


Globulin    5.3  


 


Albumin/Globulin Ratio    0.60  


 


Ethyl Alcohol     (0)  mg/dL


 


SARS-CoV-2 RNA (SHIRLEY)     (NEGATIVE)  














  06/29/21 06/29/21 06/29/21 Range/Units





  07:31 07:31 07:31 


 


WBC     (5.0-10.0)  10^3/uL


 


RBC     (4.6-6.2)  10^6/uL


 


Hgb     (14.0-18.0)  g/dL


 


Hct     (40.0-54.0)  %


 


MCV     ()  fL


 


MCH     (27.0-34.0)  pg


 


MCHC     (33.0-35.0)  g/dL


 


Plt Count     (150-450)  10^3/uL


 


Neut % (Auto)     (42.2-75.2)  %


 


Lymph % (Auto)     (20.5-50.1)  %


 


Mono % (Auto)     (2-8)  %


 


Eos % (Auto)     (1.0-3.0)  %


 


Baso % (Auto)     (0.0-1.0)  %


 


Add Manual Diff     


 


Neutrophils % (Manual)     (42-75)  %


 


Band Neutrophils %     %


 


Lymphocytes % (Manual)     (20-50)  %


 


Monocytes % (Manual)     (2-8)  %


 


ESR    66 H  (0-15)  mm/hr


 


Sodium     (136-145)  mmol/L


 


Potassium     (3.5-5.1)  mmol/L


 


Chloride     ()  mmol/L


 


Carbon Dioxide     (21-32)  mmol/L


 


Anion Gap     (7-13)  mEq/L


 


BUN     (7-18)  mg/dL


 


Creatinine     (0.70-1.30)  mg/dL


 


Est Cr Clr Drug Dosing     mL/min


 


Estimated GFR (MDRD)     


 


BUN/Creatinine Ratio     (No establ ref range)  


 


Glucose     (70-99)  mg/dL


 


POC Glucose     (70-99)  mg/dL


 


Lactic Acid   2.9 H*   (0.4-2.0)  mmol/L


 


Uric Acid  8.2 H    (3.5-7.2)  mg/dL


 


Calcium     (8.5-10.1)  mg/dL


 


Magnesium  2.0    (1.8-2.4)  mg/dL


 


Total Bilirubin     (0.2-1.0)  mg/dL


 


AST     (15-37)  U/L


 


ALT     (16-63)  U/L


 


Alkaline Phosphatase     ()  U/L


 


C-Reactive Protein     (0.0-0.9)  mg/dL


 


Total Protein     (6.4-8.2)  g/dL


 


Albumin     (3.4-5.0)  g/dL


 


Globulin     


 


Albumin/Globulin Ratio     


 


Ethyl Alcohol  < 3    (0)  mg/dL


 


SARS-CoV-2 RNA (SHIRLEY)     (NEGATIVE)  














  06/29/21 06/29/21 Range/Units





  07:31 08:43 


 


WBC    (5.0-10.0)  10^3/uL


 


RBC    (4.6-6.2)  10^6/uL


 


Hgb    (14.0-18.0)  g/dL


 


Hct    (40.0-54.0)  %


 


MCV    ()  fL


 


MCH    (27.0-34.0)  pg


 


MCHC    (33.0-35.0)  g/dL


 


Plt Count    (150-450)  10^3/uL


 


Neut % (Auto)    (42.2-75.2)  %


 


Lymph % (Auto)    (20.5-50.1)  %


 


Mono % (Auto)    (2-8)  %


 


Eos % (Auto)    (1.0-3.0)  %


 


Baso % (Auto)    (0.0-1.0)  %


 


Add Manual Diff    


 


Neutrophils % (Manual)    (42-75)  %


 


Band Neutrophils %    %


 


Lymphocytes % (Manual)    (20-50)  %


 


Monocytes % (Manual)    (2-8)  %


 


ESR    (0-15)  mm/hr


 


Sodium    (136-145)  mmol/L


 


Potassium    (3.5-5.1)  mmol/L


 


Chloride    ()  mmol/L


 


Carbon Dioxide    (21-32)  mmol/L


 


Anion Gap    (7-13)  mEq/L


 


BUN    (7-18)  mg/dL


 


Creatinine    (0.70-1.30)  mg/dL


 


Est Cr Clr Drug Dosing    mL/min


 


Estimated GFR (MDRD)    


 


BUN/Creatinine Ratio    (No establ ref range)  


 


Glucose    (70-99)  mg/dL


 


POC Glucose    (70-99)  mg/dL


 


Lactic Acid    (0.4-2.0)  mmol/L


 


Uric Acid    (3.5-7.2)  mg/dL


 


Calcium    (8.5-10.1)  mg/dL


 


Magnesium    (1.8-2.4)  mg/dL


 


Total Bilirubin    (0.2-1.0)  mg/dL


 


AST    (15-37)  U/L


 


ALT    (16-63)  U/L


 


Alkaline Phosphatase    ()  U/L


 


C-Reactive Protein  20.5 H   (0.0-0.9)  mg/dL


 


Total Protein    (6.4-8.2)  g/dL


 


Albumin    (3.4-5.0)  g/dL


 


Globulin    


 


Albumin/Globulin Ratio    


 


Ethyl Alcohol    (0)  mg/dL


 


SARS-CoV-2 RNA (SHIRLEY)   Negative  (NEGATIVE)  











Result Diagrams: 


                                 06/29/21 07:31





                                 06/29/21 07:31


Problem List Initiated/Reviewed/Updated: Yes


Orders Last 24hrs: 


                               Active Orders 24 hr











 Category Date Time Status


 


 Admission Diagnosis [ADT] Stat ADT  06/29/21 08:30 Ordered


 


 Admission Status [Patient Status] [ADT] Routine ADT  06/29/21 08:30 Active


 


 Blood Glucose Check, Bedside [RC] ONETIME Care  06/29/21 07:17 Active


 


 Peripheral IV Care [RC] .AS DIRECTED Care  06/29/21 10:02 Ordered


 


 Up With Assistance [RC] ASDIRECTED Care  06/29/21 10:00 Ordered


 


 Vital Signs [RC] Q4H Care  06/29/21 10:00 Ordered


 


 Heart Healthy Diet [DIET] Diet  06/29/21 Breakfast Ordered


 


 CBC WITH AUTO DIFF [HEME] Routine Lab  06/30/21 05:00 Ordered


 


 CMP [COMPREHENSIVE METABOLIC PN,CMP] [CHEM] Routine Lab  06/30/21 05:00 Ordered


 


 CULTURE BLOOD [BC] Stat Lab  06/29/21 07:31 Received


 


 DRUG SCREEN URINE BIORAD [URCHEM] Urgent Lab  06/29/21 07:17 Ordered


 


 LACTIC ACID [CHEM] Routine Lab  06/29/21 12:00 Ordered


 


 REFLEX LACTIC ACID YES OR NO [CHEM] Routine Lab  06/29/21 08:07 Received


 


 UA RFX NGA AND CULT IF INDIC [URIN] Stat Lab  06/29/21 07:17 Ordered


 


 URIC ACID [CHEM] Routine Lab  06/30/21 05:00 Ordered


 


 Acetaminophen [TylenoL] Med  06/29/21 10:00 Ordered





 650 mg PO Q4H PRN   


 


 Acetaminophen/oxyCODONE [Percocet 325-5 MG] Med  06/29/21 10:00 Ordered





 1 tab PO Q4H PRN   


 


 Colchicine [Colcrys] Med  06/29/21 10:15 Ordered





 1.2 mg PO DAILY   


 


 Enoxaparin [Lovenox] Med  06/30/21 09:00 Ordered





 40 mg SUBCUT DAILY   


 


 Indomethacin [Indocin] Med  06/29/21 12:00 Ordered





 50 mg PO TIDMEALS   


 


 Ondansetron [Zofran] Med  06/29/21 10:00 Ordered





 4 mg IVPUSH Q4H PRN   


 


 Pantoprazole [ProTONIX***] Med  06/30/21 06:00 Ordered





 40 mg PO ACBREAKFAST   


 


 Sodium Chloride 0.9% [Normal Saline] 1,000 ml Med  06/29/21 10:00 Ordered





 IV ASDIRECTED   


 


 Sodium Chloride 0.9% [Saline Flush] Med  06/29/21 10:00 Ordered





 10 ml FLUSH ASDIRECTED PRN   


 


 predniSONE Med  06/29/21 10:30 Ordered





 60 mg PO WITHBREAKFAST   


 


 Peripheral IV Insertion Adult [OM.PC] Routine Oth  06/29/21 10:00 Ordered


 


 Resuscitation Status Routine Resus Stat  06/29/21 10:00 Ordered








                                Medication Orders





Acetaminophen (Acetaminophen 325 Mg Tab)  650 mg PO Q4H PRN


   PRN Reason: Pain (Mild 1-3)/fever


Enoxaparin Sodium (Enoxaparin 40 Mg/0.4 Ml Syringe)  40 mg SUBCUT DAILY TRACI


Sodium Chloride (Normal Saline)  1,000 mls @ 100 mls/hr IV ASDIRECTED TRACI


Ondansetron HCl (Ondansetron 4 Mg/2 Ml Sdv)  4 mg IVPUSH Q4H PRN


   PRN Reason: Nausea/Vomiting


Oxycodone/Acetaminophen (Acetaminophen/Oxycodone 325-5 Mg Tab)  1 tab PO Q4H PRN


   PRN Reason: Pain (moderate 4-6)


Sodium Chloride (Sodium Chloride 0.9% 10 Ml Syringe)  10 ml FLUSH ASDIRECTED PRN


   PRN Reason: Keep Vein Open








Assessment/Plan Comment:: 





Surgical History:


Right hip surgery











Family History:


Diabetes, coronary artery disease











Social History:





Tobacco:


Active smoker








Alcohol:


Patient has a history of alcohol abuse but has been in abstinence for at least a

 week








Caffeine:


Denies








Drugs:


Currently none.  Past use: Marijuana, methamphetamine








Allergies:


No known drug allergies








Code Status:


DNR, DNI











Assessment / Plan:


Presumed acute gout flare.  Will monitor uric acid level intermittently.  IV 

normal saline 100 mL/h) on 6 mg p.o. daily plus colchicine 1.2 mg p.o. daily 

plus indomethacin 50 mg p.o. every 8 hours





Hyperbilirubinemia.  Will monitor bilirubin levels intermittently with CMP





Chronic pain





Arthritis





Hepatitis C.  Outpatient follow-up with infectious disease or gastroenterology 

upon discharge





COPD





Hypertension





Obesity.  Patient becomes regarding left eye medication





Smoker.  Patient counseled regarding smoking cessation





Medical noncompliance.  Patient will be counseled regarding medical compliance





GI prophylaxis.  Protonix 40 mg p.o. daily





DVT prophylaxis.  Lovenox 40 mg subcutaneously daily











Disposition:


Anticipate discharge within 24 hours











END OF DOCTOR EMAMIS HISTORY AND PHYSICAL / CONSULTATION NOTE

## 2021-06-29 NOTE — EDM.PDOC
ED HPI GENERAL MEDICAL PROBLEM





- General


Chief Complaint: Lower Extremity Injury/Pain


Stated Complaint: AMBULANCE


Time Seen by Provider: 06/29/21 07:22


Source of Information: Reports: Patient, Old Records, RN


History Limitations: Reports: No Limitations





- History of Present Illness


INITIAL COMMENTS - FREE TEXT/NARRATIVE: 


Marshall is a 62 y/o male with a history of gout who presents to the ED via 

[facility] with complaints of pain in his bilateral knees and left elbow.  

The patient was examined in this facility twice last week and was diagnosed with

a gout flair; he was subsequently started on indomethacin and was given a 

prescription of allopurinol to start once his flair had subsided.  The patient 

reports his indomethacin is no longer helping with the pain in his joints.  He 

reports he has not eaten or drank water in 5 days as he was unable to walk 

within his home.  Additionally, the patient attest to shaking chills and 

diarrhea.  He denies fever, palpitations, shortness of breath, nausea, vomiting,

or abdominal pain.  





  ** Bilateral Knee


Pain Score (Numeric/FACES): 7





- Related Data


                                    Allergies











Allergy/AdvReac Type Severity Reaction Status Date / Time


 


No Known Allergies Allergy   Verified 06/29/21 07:05











Home Meds: 


                                    Home Meds





Acetaminophen/HYDROcodone [Norco 325-10 MG] 1 tab PO DAILY PRN 06/29/21 

[History]


Indomethacin 50 mg PO TID 06/29/21 [History]


allopurinoL [Zyloprim] 200 mg PO BID 06/29/21 [History]











Past Medical History





- Past Health History


Medical/Surgical History: Denies Medical/Surgical History


HEENT History: Reports: Impaired Vision


Cardiovascular History: Reports: None


Respiratory History: Reports: COPD


Gastrointestinal History: Reports: Other (See Below)


Other Gastrointestinal History: hepatitis C


Genitourinary History: Reports: None


Musculoskeletal History: Reports: Arthritis, Back Pain, Chronic, Gout


Neurological History: Reports: None


Psychiatric History: Reports: None


Endocrine/Metabolic History: Reports: None


Hematologic History: Reports: None


Immunologic History: Reports: None


Oncologic (Cancer) History: Reports: None


Dermatologic History: Reports: None





- Infectious Disease History


Infectious Disease History: Reports: Hepatitis C





- Past Surgical History


Head Surgeries/Procedures: Reports: None


Cardiovascular Surgical History: Reports: None


Respiratory Surgical History: Reports: None


GI Surgical History: Reports: None


Male  Surgical History: Reports: None


Endocrine Surgical History: Reports: None


Oncologic Surgical History: Reports: None


Dermatological Surgical History: Reports: None





Social & Family History





- Family History


Family Medical History: No Pertinent Family History





- Tobacco Use


Tobacco Use Status *Q: Current Every Day Tobacco User


Years of Tobacco use: 35


Packs/Tins Daily: 1





- Caffeine Use


Caffeine Use: Reports: Coffee





- Recreational Drug Use


Recreational Drug Use: No





- Living Situation & Occupation


Living situation: Reports: with Family


Occupation: Disabled





Review of Systems





- Review of Systems


Review Of Systems: Comprehensive ROS is negative, except as noted in HPI.





ED EXAM, GENERAL





- Physical Exam


Exam: See Below


Exam Limited By: No Limitations


General Appearance: Alert, Obese, Other (Unkept and disheveled)


Eye Exam: Bilateral Eye: EOMI, Normal Inspection, PERRL (3mm)


Ears: Normal External Exam, Hearing Grossly Normal


Nose: Normal Inspection, Normal Mucosa, No Blood


Throat/Mouth: Normal Voice, No Airway Compromise.  No: Normal Oropharynx (Dry 

mucous membranes)


Head: Atraumatic, Normocephalic


Neck: Normal Inspection, Supple, Non-Tender, Full Range of Motion


Respiratory/Chest: No Respiratory Distress, Lungs Clear, Normal Breath Sounds, 

No Accessory Muscle Use, Chest Non-Tender


Cardiovascular: Normal Peripheral Pulses, Regular Rate, Rhythm, No Gallop, No 

JVD, No Murmur, No Rub


Peripheral Pulses: 2+: Radial (L), Radial (R), Dorsalis Pedis (L), Dorsalis 

Pedis (R)


GI/Abdominal: Soft, No Distention, No Abnormal Bruit, No Mass, Pelvis Stable, 

Abnormal Bowel Sounds (Hyperactive )


Back Exam: Normal Inspection, Full Range of Motion


Extremities: Joint Swelling (To left knee and left elbow), Arm Pain (To left 

posterior elbow), Leg Pain (To bilateral anterior knees), Limited Range of 

Motion, Increased Warmth (To left knee and elbow), Redness (To left knee and 

elbow)


Neurological: Alert, Oriented, CN II-XII Intact, Normal Cognition, No 

Motor/Sensory Deficits, Abnormal Gait (Unable to ambulate due to pain)


Psychiatric: Normal Affect, Depressed Mood


Skin Exam: Warm, Dry, Intact, Erythema (Left knee and elbow), Increased Warmth 

(Left knee and elbow).  No: Jaundice, Mottled, Pallor, Petechiae





Course





- Vital Signs


Last Recorded V/S: 


                                Last Vital Signs











Temp  97.1 F   06/29/21 17:13


 


Pulse  70   06/29/21 17:13


 


Resp  17   06/29/21 17:13


 


BP  132/81   06/29/21 17:13


 


Pulse Ox  96   06/29/21 17:13














- Orders/Labs/Meds


Orders: 


                               Active Orders 24 hr











 Category Date Time Status


 


 Admission Diagnosis [ADT] Stat ADT  06/29/21 08:30 Ordered


 


 Admission Status [Patient Status] [ADT] Routine ADT  06/29/21 08:30 Active


 


 CULTURE BLOOD [BC] Stat Lab  06/29/21 07:31 Received


 


 DRUG SCREEN URINE BIORAD [URCHEM] Urgent Lab  06/29/21 07:17 Ordered


 


 UA RFX NGA AND CULT IF INDIC [URIN] Stat Lab  06/29/21 07:17 Ordered








                                Medication Orders





Acetaminophen (Acetaminophen 325 Mg Tab)  650 mg PO Q4H PRN


   PRN Reason: Pain (Mild 1-3)/fever


Colchicine (Colchicine 0.6 Mg Tab)  1.2 mg PO DAILY [facility]


   Last Admin: 06/29/21 11:24  Dose: 1.2 mg


   Documented by: TRAVIS


Enoxaparin Sodium (Enoxaparin 40 Mg/0.4 Ml Syringe)  40 mg SUBCUT DAILY [facility]


Sodium Chloride (Normal Saline)  1,000 mls @ 100 mls/hr IV ASDIRECTED [facility]


   Last Admin: 06/29/21 11:26  Dose: 100 mls/hr


   Documented by: TRAVIS


Indomethacin (Indomethacin 25 Mg Cap)  50 mg PO TIDMEALS [facility]


   Last Admin: 06/29/21 17:12  Dose: 50 mg


   Documented by: TRAVIS


   Admin: 06/29/21 11:24  Dose: 50 mg


   Documented by: TRAVIS


Miscellaneous Information (Remove Nicotine Patch)  1 ea TRDERM DAILY [facility]


Nicotine (Nicotine 21 Mg/24 Hr Patch)  21 mg TRDERM DAILY PRN


   PRN Reason: Smoking Cessation


   Last Admin: 06/29/21 17:11  Dose: 21 mg


   Documented by: TRAVIS


Ondansetron HCl (Ondansetron 4 Mg/2 Ml Sdv)  4 mg IVPUSH Q4H PRN


   PRN Reason: Nausea/Vomiting


Oxycodone/Acetaminophen (Acetaminophen/Oxycodone 325-5 Mg Tab)  1 tab PO Q4H PRN


   PRN Reason: Pain (moderate 4-6)


Pantoprazole Sodium (Pantoprazole 40 Mg Tab.Cr)  40 mg PO ACBREAKFAST [facility]I


Prednisone (Prednisone 20 Mg Tab)  60 mg PO WITHBREAKFAST [facility]


   Last Admin: 06/29/21 11:24  Dose: 60 mg


   Documented by: TRAVIS


Senna/Docusate Sodium (Docusate Sodium/Sennosides 50-8.6 Mg Tab)  2 tab PO BID 

PRN


   PRN Reason: Constipation


Sodium Chloride (Sodium Chloride 0.9% 10 Ml Syringe)  10 ml FLUSH ASDIRECTED PRN


   PRN Reason: Keep Vein Open








Labs: 


                                Laboratory Tests











  06/29/21 06/29/21 06/29/21 Range/Units





  07:19 07:31 07:31 


 


WBC   13.0 H   (5.0-10.0)  10^3/uL


 


RBC   4.74   (4.6-6.2)  10^6/uL


 


Hgb   16.0   (14.0-18.0)  g/dL


 


Hct   47.0   (40.0-54.0)  %


 


MCV   99.2  D   ()  fL


 


MCH   33.8   (27.0-34.0)  pg


 


MCHC   34.0   (33.0-35.0)  g/dL


 


Plt Count   285  D   (150-450)  10^3/uL


 


Neut % (Auto)   71.4   (42.2-75.2)  %


 


Lymph % (Auto)   12.7 L   (20.5-50.1)  %


 


Mono % (Auto)   15.3 H   (2-8)  %


 


Eos % (Auto)   0.2 L   (1.0-3.0)  %


 


Baso % (Auto)   0.4   (0.0-1.0)  %


 


Add Manual Diff   Yes   


 


Neutrophils % (Manual)   70   (42-75)  %


 


Band Neutrophils %   3   %


 


Lymphocytes % (Manual)   16 L   (20-50)  %


 


Monocytes % (Manual)   11 H   (2-8)  %


 


ESR     (0-15)  mm/hr


 


Sodium    137  (136-145)  mmol/L


 


Potassium    3.7  (3.5-5.1)  mmol/L


 


Chloride    96 L  ()  mmol/L


 


Carbon Dioxide    23  (21-32)  mmol/L


 


Anion Gap    21.7 H  (7-13)  mEq/L


 


BUN    16  (7-18)  mg/dL


 


Creatinine    1.30  (0.70-1.30)  mg/dL


 


Est Cr Clr Drug Dosing    65.50  mL/min


 


Estimated GFR (MDRD)    56  


 


BUN/Creatinine Ratio    12.3  (No establ ref range)  


 


Glucose    133 H  (70-99)  mg/dL


 


POC Glucose  145 H    (70-99)  mg/dL


 


Lactic Acid     (0.4-2.0)  mmol/L


 


Uric Acid     (3.5-7.2)  mg/dL


 


Calcium    9.3  (8.5-10.1)  mg/dL


 


Magnesium     (1.8-2.4)  mg/dL


 


Total Bilirubin    1.2 H  (0.2-1.0)  mg/dL


 


AST    30  (15-37)  U/L


 


ALT    43  (16-63)  U/L


 


Alkaline Phosphatase    111  ()  U/L


 


C-Reactive Protein     (0.0-0.9)  mg/dL


 


Total Protein    8.5 H  (6.4-8.2)  g/dL


 


Albumin    3.2 L  (3.4-5.0)  g/dL


 


Globulin    5.3  


 


Albumin/Globulin Ratio    0.60  


 


Ethyl Alcohol     (0)  mg/dL


 


SARS-CoV-2 RNA ([facility]LEY)     (NEGATIVE)  














  06/29/21 06/29/21 06/29/21 Range/Units





  07:31 07:31 07:31 


 


WBC     (5.0-10.0)  10^3/uL


 


RBC     (4.6-6.2)  10^6/uL


 


Hgb     (14.0-18.0)  g/dL


 


Hct     (40.0-54.0)  %


 


MCV     ()  fL


 


MCH     (27.0-34.0)  pg


 


MCHC     (33.0-35.0)  g/dL


 


Plt Count     (150-450)  10^3/uL


 


Neut % (Auto)     (42.2-75.2)  %


 


Lymph % (Auto)     (20.5-50.1)  %


 


Mono % (Auto)     (2-8)  %


 


Eos % (Auto)     (1.0-3.0)  %


 


Baso % (Auto)     (0.0-1.0)  %


 


Add Manual Diff     


 


Neutrophils % (Manual)     (42-75)  %


 


Band Neutrophils %     %


 


Lymphocytes % (Manual)     (20-50)  %


 


Monocytes % (Manual)     (2-8)  %


 


ESR    66 H  (0-15)  mm/hr


 


Sodium     (136-145)  mmol/L


 


Potassium     (3.5-5.1)  mmol/L


 


Chloride     ()  mmol/L


 


Carbon Dioxide     (21-32)  mmol/L


 


Anion Gap     (7-13)  mEq/L


 


BUN     (7-18)  mg/dL


 


Creatinine     (0.70-1.30)  mg/dL


 


Est Cr Clr Drug Dosing     mL/min


 


Estimated GFR (MDRD)     


 


BUN/Creatinine Ratio     (No establ ref range)  


 


Glucose     (70-99)  mg/dL


 


POC Glucose     (70-99)  mg/dL


 


Lactic Acid   2.9 H*   (0.4-2.0)  mmol/L


 


Uric Acid  8.2 H    (3.5-7.2)  mg/dL


 


Calcium     (8.5-10.1)  mg/dL


 


Magnesium  2.0    (1.8-2.4)  mg/dL


 


Total Bilirubin     (0.2-1.0)  mg/dL


 


AST     (15-37)  U/L


 


ALT     (16-63)  U/L


 


Alkaline Phosphatase     ()  U/L


 


C-Reactive Protein     (0.0-0.9)  mg/dL


 


Total Protein     (6.4-8.2)  g/dL


 


Albumin     (3.4-5.0)  g/dL


 


Globulin     


 


Albumin/Globulin Ratio     


 


Ethyl Alcohol  < 3    (0)  mg/dL


 


SARS-CoV-2 RNA ([facility])     (NEGATIVE)  














  06/29/21 06/29/21 Range/Units





  07:31 08:43 


 


WBC    (5.0-10.0)  10^3/uL


 


RBC    (4.6-6.2)  10^6/uL


 


Hgb    (14.0-18.0)  g/dL


 


Hct    (40.0-54.0)  %


 


MCV    ()  fL


 


MCH    (27.0-34.0)  pg


 


MCHC    (33.0-35.0)  g/dL


 


Plt Count    (150-450)  10^3/uL


 


Neut % (Auto)    (42.2-75.2)  %


 


Lymph % (Auto)    (20.5-50.1)  %


 


Mono % (Auto)    (2-8)  %


 


Eos % (Auto)    (1.0-3.0)  %


 


Baso % (Auto)    (0.0-1.0)  %


 


Add Manual Diff    


 


Neutrophils % (Manual)    (42-75)  %


 


Band Neutrophils %    %


 


Lymphocytes % (Manual)    (20-50)  %


 


Monocytes % (Manual)    (2-8)  %


 


ESR    (0-15)  mm/hr


 


Sodium    (136-145)  mmol/L


 


Potassium    (3.5-5.1)  mmol/L


 


Chloride    ()  mmol/L


 


Carbon Dioxide    (21-32)  mmol/L


 


Anion Gap    (7-13)  mEq/L


 


BUN    (7-18)  mg/dL


 


Creatinine    (0.70-1.30)  mg/dL


 


Est Cr Clr Drug Dosing    mL/min


 


Estimated GFR (MDRD)    


 


BUN/Creatinine Ratio    (No establ ref range)  


 


Glucose    (70-99)  mg/dL


 


POC Glucose    (70-99)  mg/dL


 


Lactic Acid    (0.4-2.0)  mmol/L


 


Uric Acid    (3.5-7.2)  mg/dL


 


Calcium    (8.5-10.1)  mg/dL


 


Magnesium    (1.8-2.4)  mg/dL


 


Total Bilirubin    (0.2-1.0)  mg/dL


 


AST    (15-37)  U/L


 


ALT    (16-63)  U/L


 


Alkaline Phosphatase    ()  U/L


 


C-Reactive Protein  20.5 H   (0.0-0.9)  mg/dL


 


Total Protein    (6.4-8.2)  g/dL


 


Albumin    (3.4-5.0)  g/dL


 


Globulin    


 


Albumin/Globulin Ratio    


 


Ethyl Alcohol    (0)  mg/dL


 


SARS-CoV-2 RNA ([facility])   Negative  (NEGATIVE)  











Meds: 


Medications











Generic Name Dose Route Start Last Admin





  Trade Name Freq  PRN Reason Stop Dose Admin


 


Acetaminophen  650 mg  06/29/21 10:00 





  Acetaminophen 325 Mg Tab  PO  





  Q4H PRN  





  Pain (Mild 1-3)/fever  


 


Colchicine  1.2 mg  06/29/21 11:00  06/29/21 11:24





  Colchicine 0.6 Mg Tab  PO   1.2 mg





  DAILY [facility]   Administration


 


Enoxaparin Sodium  40 mg  06/30/21 09:00 





  Enoxaparin 40 Mg/0.4 Ml Syringe  SUBCUT  





  DAILY [facility]  


 


Sodium Chloride  1,000 mls @ 100 mls/hr  06/29/21 10:00  06/29/21 11:26





  Normal Saline  IV   100 mls/hr





  ASDIRECTED [facility]   Administration


 


Indomethacin  50 mg  06/29/21 12:00  06/29/21 17:12





  Indomethacin 25 Mg Cap  PO   50 mg





  TIDMEALS [facility]   Administration


 


Miscellaneous Information  1 ea  06/30/21 09:00 





  Remove Nicotine Patch  TRDERM  





  DAILY [facility]  


 


Nicotine  21 mg  06/29/21 14:08  06/29/21 17:11





  Nicotine 21 Mg/24 Hr Patch  TRDERM   21 mg





  DAILY PRN   Administration





  Smoking Cessation  


 


Ondansetron HCl  4 mg  06/29/21 10:00 





  Ondansetron 4 Mg/2 Ml Sdv  IVPUSH  





  Q4H PRN  





  Nausea/Vomiting  


 


Oxycodone/Acetaminophen  1 tab  06/29/21 10:00 





  Acetaminophen/Oxycodone 325-5 Mg Tab  PO  





  Q4H PRN  





  Pain (moderate 4-6)  


 


Pantoprazole Sodium  40 mg  06/30/21 06:00 





  Pantoprazole 40 Mg Tab.Cr  PO  





  ACBREAKFAST [facility]ACI  


 


Prednisone  60 mg  06/29/21 11:00  06/29/21 11:24





  Prednisone 20 Mg Tab  PO   60 mg





  WITHBREAKFAST TRACI   Administration


 


Senna/Docusate Sodium  2 tab  06/29/21 19:43 





  Docusate Sodium/Sennosides 50-8.6 Mg Tab  PO  





  BID PRN  





  Constipation  


 


Sodium Chloride  10 ml  06/29/21 10:00 





  Sodium Chloride 0.9% 10 Ml Syringe  FLUSH  





  ASDIRECTED PRN  





  Keep Vein Open  














Discontinued Medications














Generic Name Dose Route Start Last Admin





  Trade Name Freq  PRN Reason Stop Dose Admin


 


Hydromorphone HCl  1 mg  06/29/21 08:11  06/29/21 08:21





  Hydromorphone 1 Mg/Ml Syringe  IVPUSH  06/29/21 08:12  1 mg





  ONETIME ONE   Administration


 


Sodium Chloride  1,000 mls @ 999 mls/hr  06/29/21 07:18  06/29/21 07:24





  Normal Saline  IV  06/29/21 08:18  999 mls/hr





  .BOLUS ONE   Administration


 


Pantoprazole Sodium 40 mg/  100 mls @ 200 mls/hr  06/29/21 08:12  06/29/21 08:21





  Sodium Chloride  IV  06/29/21 08:41  200 mls/hr





  ONETIME ONE   Administration


 


Ketorolac Tromethamine  30 mg  06/29/21 07:32  06/29/21 07:40





  Ketorolac 30 Mg/Ml Sdv  IVPUSH  06/29/21 07:33  30 mg





  ONETIME ONE   Administration


 


Methylprednisolone Sodium Succinate  125 mg  06/29/21 08:11  06/29/21 08:21





  Methylprednisolone Sodium Succinate 125 Mg/2 Ml Sdv  IVPUSH  06/29/21 08:12  

125 mg





  ONETIME ONE   Administration














- Re-Assessments/Exams


Free Text/Narrative Re-Assessment/Exam: 





06/29/21 


NS 1L bolus. Ketorolac 30 mg IV administered while labs pending. 


Patient verbalized no improvement in pain.  Solu-Medrol 125 mg IVP and Dilaudid 

1mg IVP administered.  





Given failed outpatient treatment, case discussed with Dr. Browne who kindly 

agreed to accept patient for observation.  





Findings of examination, lab work, and discussion with Dr. Browne reviewed with 

patient.  Patient verbalized understanding and agreement with the plan of care. 











Departure





- Departure


Time of Disposition: 08:38


Disposition: Refer to Observation


Condition: Fair


Clinical Impression: 


Gout attack


Qualifiers:


 Gout site: multiple sites Gout etiology: idiopathic Qualified Code(s): M10.09 -

 Idiopathic gout, multiple sites








- Discharge Information





Sepsis Event Note (ED)





- Evaluation


Sepsis Screening Result: No Definite Risk





- My Orders


Last 24 Hours: 


My Active Orders





06/29/21 07:17


DRUG SCREEN URINE BIORAD [URCHEM] Urgent 


UA RFX NGA AND CULT IF INDIC [URIN] Stat 





06/29/21 07:31


CULTURE BLOOD [BC] Stat 





06/29/21 08:30


Admission Diagnosis [ADT] Stat 


Admission Status [Patient Status] [ADT] Routine 














- Assessment/Plan


Last 24 Hours: 


My Active Orders





06/29/21 07:17


DRUG SCREEN URINE BIORAD [URCHEM] Urgent 


UA RFX NGA AND CULT IF INDIC [URIN] Stat 





06/29/21 07:31


CULTURE BLOOD [BC] Stat 





06/29/21 08:30


Admission Diagnosis [ADT] Stat 


Admission Status [Patient Status] [ADT] Routine

## 2021-06-30 VITALS — SYSTOLIC BLOOD PRESSURE: 138 MMHG | HEART RATE: 76 BPM | DIASTOLIC BLOOD PRESSURE: 78 MMHG

## 2021-06-30 LAB
ANION GAP SERPL CALC-SCNC: 17.4 MEQ/L (ref 7–13)
CHLORIDE SERPL-SCNC: 101 MMOL/L (ref 98–107)
SODIUM SERPL-SCNC: 137 MMOL/L (ref 136–145)

## 2021-06-30 NOTE — [id number]
**Discharge Summary





- Hospital Course


Free Text/Narrative:: 





START OF DOCTOR EMAMIS DISCHARGE SUMMARY














Date of Admission:


6/29/2021








Date of Discharge:


7:35 AM on 6/30/2021











Primary Diagnosis:


Acute gout flare








Secondary Diagnosis:


Hyperbilirubinemia





Chronic pain





Arthritis





Hepatitis C





COPD





Hypertension





Obesity





Smoker





Anemia





Medical noncompliance











Consultations:


None











Condition on Discharge:


Fair











Disposition:


The patient will be advised follow-up with his primary care physician or 

provider as needed





The patient is advised to follow-up with gastroenterology or infectious disease 

2 to 4 weeks post discharge for his history of hepatitis C











Discharge Medications:


Prednisone 10 mg p.o.: 6 tabs daily x2 days then 5 tabs daily x2 days then 4 

tabs daily x2 days then 3 tab daily x2 days then 2 tabs daily x2 days then 1 tab

daily x2 days.  Quantity sufficient.  0 refills





Indomethacin 50 mg p.o. every 8 hours x3 days as needed acute gout flare.  

Quantity 60.  0 refills





Norco 10/305 mg p.o. daily as needed pain





Protonix 40 mg p.o. daily.  Quantity 15.  0 refills.  This is for GI prophylaxis

while the patient is on prednisone and it is not for a diagnosis of 

dyspepsia/GERD





Tylenol 650 mg p.o. every 4 hours as needed minor pain





Senna plus: 2 tabs p.o. twice daily as needed constipation.  Quantity 30.  0 

refills





Colchicine 0.6 mg p.o. daily.  Quantity 30.  0 refills











END OF DOCTOR EMAMIS DISCHARGE SUMMARY





- Discharge Data


Discharge Date: 06/30/21


Discharge Disposition: Home, Self-Care 01


Condition: Fair





- Referral to Home Health


Primary Care Physician: 


PCP None








- Patient Instructions


Diet: Low Sodium


Activity: As Tolerated





- Discharge Plan


Prescriptions/Med Rec: 


Colchicine 0.6 mg PO DAILY 30 Days #30 capsule


Indomethacin [Indocin] 50 mg PO TIDMEALS PRN 30 Days #60 cap


 PRN Reason: Other


predniSONE [Prednisone] 10 mg PO DAILY 1 Days #1 tablet


Pantoprazole [ProTONIX***] 40 mg PO ACBREAKFAST 15 Days #15 tab.cr


Docusate Sodium/Sennosides [Senna Plus] 2 tab PO BID PRN 30 Days #30 tablet


 PRN Reason: Constipation


Home Medications: 


                                    Home Meds





Acetaminophen/HYDROcodone [Norco 325-10 MG] 1 tab PO DAILY PRN 06/29/21 

[History]


Acetaminophen [Tylenol] 650 mg PO Q4H PRN  tablet 06/30/21 [Rx]


Colchicine 0.6 mg PO DAILY 30 Days #30 capsule 06/30/21 [Rx]


Docusate Sodium/Sennosides [Senna Plus] 2 tab PO BID PRN 30 Days #30 tablet 

06/30/21 [Rx]


Indomethacin [Indocin] 50 mg PO TIDMEALS PRN 30 Days #60 cap 06/30/21 [Rx]


Pantoprazole [ProTONIX***] 40 mg PO ACBREAKFAST 15 Days #15 tab.cr 06/30/21 [Rx]


predniSONE [Prednisone] 10 mg PO DAILY 1 Days #1 tablet 06/30/21 [Rx]








Forms:  ED Department Discharge


Referrals: 


PCP,None [Primary Care Provider] - 





- Discharge Summary/Plan Comment


DC Time >30 min.: Yes





- General Info


Date of Service: 06/30/21





- Review of Systems


General: Reports: No Symptoms


HEENT: Reports: No Symptoms


Pulmonary: Reports: No Symptoms


Cardiovascular: Reports: No Symptoms


Gastrointestinal: Reports: No Symptoms


Genitourinary: Reports: No Symptoms


Musculoskeletal: Reports: No Symptoms


Skin: Reports: No Symptoms


Neurological: Reports: No Symptoms


Psychiatric: Reports: No Symptoms





- Patient Data


Vitals - Most Recent: 


                                Last Vital Signs











Temp  96.4 F L  06/30/21 00:00


 


Pulse  63   06/30/21 00:00


 


Resp  16   06/30/21 00:00


 


BP  126/73   06/30/21 00:00


 


Pulse Ox  96   06/30/21 00:00











Weight - Most Recent: 222 lb 14.4 oz


I&O - Last 24 hours: 


                                 Intake & Output











 06/29/21 06/30/21 06/30/21





 22:59 06:59 14:59


 


Intake Total 1540 450 


 


Output Total 500 400 


 


Balance 1040 50 











Lab Results - Last 24 hrs: 


                         Laboratory Results - last 24 hr











  06/29/21 06/29/21 06/29/21 Range/Units





  07:31 07:31 07:31 


 


WBC  13.0 H    (5.0-10.0)  10^3/uL


 


RBC  4.74    (4.6-6.2)  10^6/uL


 


Hgb  16.0    (14.0-18.0)  g/dL


 


Hct  47.0    (40.0-54.0)  %


 


MCV  99.2  D    ()  fL


 


MCH  33.8    (27.0-34.0)  pg


 


MCHC  34.0    (33.0-35.0)  g/dL


 


Plt Count  285  D    (150-450)  10^3/uL


 


Neut % (Auto)  71.4    (42.2-75.2)  %


 


Lymph % (Auto)  12.7 L    (20.5-50.1)  %


 


Mono % (Auto)  15.3 H    (2-8)  %


 


Eos % (Auto)  0.2 L    (1.0-3.0)  %


 


Baso % (Auto)  0.4    (0.0-1.0)  %


 


Add Manual Diff  Yes    


 


Neutrophils % (Manual)  70    (42-75)  %


 


Band Neutrophils %  3    %


 


Lymphocytes % (Manual)  16 L    (20-50)  %


 


Monocytes % (Manual)  11 H    (2-8)  %


 


ESR     (0-15)  mm/hr


 


Sodium   137   (136-145)  mmol/L


 


Potassium   3.7   (3.5-5.1)  mmol/L


 


Chloride   96 L   ()  mmol/L


 


Carbon Dioxide   23   (21-32)  mmol/L


 


Anion Gap   21.7 H   (7-13)  mEq/L


 


BUN   16   (7-18)  mg/dL


 


Creatinine   1.30   (0.70-1.30)  mg/dL


 


Est Cr Clr Drug Dosing   65.50   mL/min


 


Estimated GFR (MDRD)   56   


 


BUN/Creatinine Ratio   12.3   (No establ ref range)  


 


Glucose   133 H   (70-99)  mg/dL


 


Lactic Acid     (0.4-2.0)  mmol/L


 


Uric Acid    8.2 H  (3.5-7.2)  mg/dL


 


Calcium   9.3   (8.5-10.1)  mg/dL


 


Magnesium    2.0  (1.8-2.4)  mg/dL


 


Total Bilirubin   1.2 H   (0.2-1.0)  mg/dL


 


AST   30   (15-37)  U/L


 


ALT   43   (16-63)  U/L


 


Alkaline Phosphatase   111   ()  U/L


 


C-Reactive Protein     (0.0-0.9)  mg/dL


 


Total Protein   8.5 H   (6.4-8.2)  g/dL


 


Albumin   3.2 L   (3.4-5.0)  g/dL


 


Globulin   5.3   


 


Albumin/Globulin Ratio   0.60   


 


Urine Color     (YELLOW)  


 


Urine Appearance     (CLEAR)  


 


Urine pH     (5.0-9.0)  


 


Ur Specific Gravity     (1.005-1.030)  


 


Urine Protein     (NEGATIVE)  


 


Urine Glucose (UA)     (NEGATIVE)  


 


Urine Ketones     (NEGATIVE)  


 


Urine Occult Blood     (NEGATIVE)  


 


Urine Nitrite     (NEGATIVE)  


 


Urine Bilirubin     (NEGATIVE)  


 


Urine Urobilinogen     (0.2-1.0)  mg/dL


 


Ur Leukocyte Esterase     (NEGATIVE)  


 


Urine RBC     /HPF


 


Urine WBC     (0-5/HPF)  /HPF


 


Ur Epithelial Cells     (NOT SEEN)  /HPF


 


Urine Bacteria     (0-FEW/HPF)  /HPF


 


Urine Mucus     (NOT SEEN)  /LPF


 


Urine Opiates Screen     (NEGATIVE)  


 


Ur Oxycodone Screen     (NEGATIVE)  


 


Urine Methadone Screen     (NEGATIVE)  


 


Ur Barbiturates Screen     (NEGATIVE)  


 


U Tricyclic Antidepress     (NEGATIVE)  


 


Ur Phencyclidine Scrn     (NEGATIVE)  


 


Ur Amphetamine Screen     (NEGATIVE)  


 


U Methamphetamines Scrn     (NEGATIVE)  


 


Urine MDMA Screen     (NEGATIVE)  


 


U Benzodiazepines Scrn     (NEGATIVE)  


 


Urine Cocaine Screen     (NEGATIVE)  


 


U Marijuana (THC) Screen     (NEGATIVE)  


 


Ethyl Alcohol    < 3  (0)  mg/dL


 


SARS-CoV-2 RNA ([facility])     (NEGATIVE)  














  06/29/21 06/29/21 06/29/21 Range/Units





  07:31 07:31 07:31 


 


WBC     (5.0-10.0)  10^3/uL


 


RBC     (4.6-6.2)  10^6/uL


 


Hgb     (14.0-18.0)  g/dL


 


Hct     (40.0-54.0)  %


 


MCV     ()  fL


 


MCH     (27.0-34.0)  pg


 


MCHC     (33.0-35.0)  g/dL


 


Plt Count     (150-450)  10^3/uL


 


Neut % (Auto)     (42.2-75.2)  %


 


Lymph % (Auto)     (20.5-50.1)  %


 


Mono % (Auto)     (2-8)  %


 


Eos % (Auto)     (1.0-3.0)  %


 


Baso % (Auto)     (0.0-1.0)  %


 


Add Manual Diff     


 


Neutrophils % (Manual)     (42-75)  %


 


Band Neutrophils %     %


 


Lymphocytes % (Manual)     (20-50)  %


 


Monocytes % (Manual)     (2-8)  %


 


ESR   66 H   (0-15)  mm/hr


 


Sodium     (136-145)  mmol/L


 


Potassium     (3.5-5.1)  mmol/L


 


Chloride     ()  mmol/L


 


Carbon Dioxide     (21-32)  mmol/L


 


Anion Gap     (7-13)  mEq/L


 


BUN     (7-18)  mg/dL


 


Creatinine     (0.70-1.30)  mg/dL


 


Est Cr Clr Drug Dosing     mL/min


 


Estimated GFR (MDRD)     


 


BUN/Creatinine Ratio     (No establ ref range)  


 


Glucose     (70-99)  mg/dL


 


Lactic Acid  2.9 H*    (0.4-2.0)  mmol/L


 


Uric Acid     (3.5-7.2)  mg/dL


 


Calcium     (8.5-10.1)  mg/dL


 


Magnesium     (1.8-2.4)  mg/dL


 


Total Bilirubin     (0.2-1.0)  mg/dL


 


AST     (15-37)  U/L


 


ALT     (16-63)  U/L


 


Alkaline Phosphatase     ()  U/L


 


C-Reactive Protein    20.5 H  (0.0-0.9)  mg/dL


 


Total Protein     (6.4-8.2)  g/dL


 


Albumin     (3.4-5.0)  g/dL


 


Globulin     


 


Albumin/Globulin Ratio     


 


Urine Color     (YELLOW)  


 


Urine Appearance     (CLEAR)  


 


Urine pH     (5.0-9.0)  


 


Ur Specific Gravity     (1.005-1.030)  


 


Urine Protein     (NEGATIVE)  


 


Urine Glucose (UA)     (NEGATIVE)  


 


Urine Ketones     (NEGATIVE)  


 


Urine Occult Blood     (NEGATIVE)  


 


Urine Nitrite     (NEGATIVE)  


 


Urine Bilirubin     (NEGATIVE)  


 


Urine Urobilinogen     (0.2-1.0)  mg/dL


 


Ur Leukocyte Esterase     (NEGATIVE)  


 


Urine RBC     /HPF


 


Urine WBC     (0-5/HPF)  /HPF


 


Ur Epithelial Cells     (NOT SEEN)  /HPF


 


Urine Bacteria     (0-FEW/HPF)  /HPF


 


Urine Mucus     (NOT SEEN)  /LPF


 


Urine Opiates Screen     (NEGATIVE)  


 


Ur Oxycodone Screen     (NEGATIVE)  


 


Urine Methadone Screen     (NEGATIVE)  


 


Ur Barbiturates Screen     (NEGATIVE)  


 


U Tricyclic Antidepress     (NEGATIVE)  


 


Ur Phencyclidine Scrn     (NEGATIVE)  


 


Ur Amphetamine Screen     (NEGATIVE)  


 


U Methamphetamines Scrn     (NEGATIVE)  


 


Urine MDMA Screen     (NEGATIVE)  


 


U Benzodiazepines Scrn     (NEGATIVE)  


 


Urine Cocaine Screen     (NEGATIVE)  


 


U Marijuana (THC) Screen     (NEGATIVE)  


 


Ethyl Alcohol     (0)  mg/dL


 


SARS-CoV-2 RNA ([facility])     (NEGATIVE)  














  06/29/21 06/29/21 06/29/21 Range/Units





  08:43 12:08 22:58 


 


WBC     (5.0-10.0)  10^3/uL


 


RBC     (4.6-6.2)  10^6/uL


 


Hgb     (14.0-18.0)  g/dL


 


Hct     (40.0-54.0)  %


 


MCV     ()  fL


 


MCH     (27.0-34.0)  pg


 


MCHC     (33.0-35.0)  g/dL


 


Plt Count     (150-450)  10^3/uL


 


Neut % (Auto)     (42.2-75.2)  %


 


Lymph % (Auto)     (20.5-50.1)  %


 


Mono % (Auto)     (2-8)  %


 


Eos % (Auto)     (1.0-3.0)  %


 


Baso % (Auto)     (0.0-1.0)  %


 


Add Manual Diff     


 


Neutrophils % (Manual)     (42-75)  %


 


Band Neutrophils %     %


 


Lymphocytes % (Manual)     (20-50)  %


 


Monocytes % (Manual)     (2-8)  %


 


ESR     (0-15)  mm/hr


 


Sodium     (136-145)  mmol/L


 


Potassium     (3.5-5.1)  mmol/L


 


Chloride     ()  mmol/L


 


Carbon Dioxide     (21-32)  mmol/L


 


Anion Gap     (7-13)  mEq/L


 


BUN     (7-18)  mg/dL


 


Creatinine     (0.70-1.30)  mg/dL


 


Est Cr Clr Drug Dosing     mL/min


 


Estimated GFR (MDRD)     


 


BUN/Creatinine Ratio     (No establ ref range)  


 


Glucose     (70-99)  mg/dL


 


Lactic Acid   1.0   (0.4-2.0)  mmol/L


 


Uric Acid     (3.5-7.2)  mg/dL


 


Calcium     (8.5-10.1)  mg/dL


 


Magnesium     (1.8-2.4)  mg/dL


 


Total Bilirubin     (0.2-1.0)  mg/dL


 


AST     (15-37)  U/L


 


ALT     (16-63)  U/L


 


Alkaline Phosphatase     ()  U/L


 


C-Reactive Protein     (0.0-0.9)  mg/dL


 


Total Protein     (6.4-8.2)  g/dL


 


Albumin     (3.4-5.0)  g/dL


 


Globulin     


 


Albumin/Globulin Ratio     


 


Urine Color    Carla  (YELLOW)  


 


Urine Appearance    Slightly cloudy  (CLEAR)  


 


Urine pH    6.0  (5.0-9.0)  


 


Ur Specific Gravity    1.020  (1.005-1.030)  


 


Urine Protein    30 H  (NEGATIVE)  


 


Urine Glucose (UA)    250 H  (NEGATIVE)  


 


Urine Ketones    15 H  (NEGATIVE)  


 


Urine Occult Blood    Negative  (NEGATIVE)  


 


Urine Nitrite    Negative  (NEGATIVE)  


 


Urine Bilirubin    Small H  (NEGATIVE)  


 


Urine Urobilinogen    2.0 H  (0.2-1.0)  mg/dL


 


Ur Leukocyte Esterase    Negative  (NEGATIVE)  


 


Urine RBC    0-5  /HPF


 


Urine WBC    0-5  (0-5/HPF)  /HPF


 


Ur Epithelial Cells    Moderate H  (NOT SEEN)  /HPF


 


Urine Bacteria    Moderate H  (0-FEW/HPF)  /HPF


 


Urine Mucus    Moderate H  (NOT SEEN)  /LPF


 


Urine Opiates Screen     (NEGATIVE)  


 


Ur Oxycodone Screen     (NEGATIVE)  


 


Urine Methadone Screen     (NEGATIVE)  


 


Ur Barbiturates Screen     (NEGATIVE)  


 


U Tricyclic Antidepress     (NEGATIVE)  


 


Ur Phencyclidine Scrn     (NEGATIVE)  


 


Ur Amphetamine Screen     (NEGATIVE)  


 


U Methamphetamines Scrn     (NEGATIVE)  


 


Urine MDMA Screen     (NEGATIVE)  


 


U Benzodiazepines Scrn     (NEGATIVE)  


 


Urine Cocaine Screen     (NEGATIVE)  


 


U Marijuana (THC) Screen     (NEGATIVE)  


 


Ethyl Alcohol     (0)  mg/dL


 


SARS-CoV-2 RNA ([facility])  Negative    (NEGATIVE)  














  06/29/21 06/30/21 Range/Units





  22:58 05:55 


 


WBC   16.9 H  (5.0-10.0)  10^3/uL


 


RBC   3.88 L  (4.6-6.2)  10^6/uL


 


Hgb   13.2 L D  (14.0-18.0)  g/dL


 


Hct   37.7 L  (40.0-54.0)  %


 


MCV   97.2  ()  fL


 


MCH   34.0  (27.0-34.0)  pg


 


MCHC   35.0  (33.0-35.0)  g/dL


 


Plt Count   286  (150-450)  10^3/uL


 


Neut % (Auto)   77.8 H  (42.2-75.2)  %


 


Lymph % (Auto)   12.1 L  (20.5-50.1)  %


 


Mono % (Auto)   9.9 H  (2-8)  %


 


Eos % (Auto)   0.0 L  (1.0-3.0)  %


 


Baso % (Auto)   0.2  (0.0-1.0)  %


 


Add Manual Diff    


 


Neutrophils % (Manual)    (42-75)  %


 


Band Neutrophils %    %


 


Lymphocytes % (Manual)    (20-50)  %


 


Monocytes % (Manual)    (2-8)  %


 


ESR    (0-15)  mm/hr


 


Sodium    (136-145)  mmol/L


 


Potassium    (3.5-5.1)  mmol/L


 


Chloride    ()  mmol/L


 


Carbon Dioxide    (21-32)  mmol/L


 


Anion Gap    (7-13)  mEq/L


 


BUN    (7-18)  mg/dL


 


Creatinine    (0.70-1.30)  mg/dL


 


Est Cr Clr Drug Dosing    mL/min


 


Estimated GFR (MDRD)    


 


BUN/Creatinine Ratio    (No establ ref range)  


 


Glucose    (70-99)  mg/dL


 


Lactic Acid    (0.4-2.0)  mmol/L


 


Uric Acid    (3.5-7.2)  mg/dL


 


Calcium    (8.5-10.1)  mg/dL


 


Magnesium    (1.8-2.4)  mg/dL


 


Total Bilirubin    (0.2-1.0)  mg/dL


 


AST    (15-37)  U/L


 


ALT    (16-63)  U/L


 


Alkaline Phosphatase    ()  U/L


 


C-Reactive Protein    (0.0-0.9)  mg/dL


 


Total Protein    (6.4-8.2)  g/dL


 


Albumin    (3.4-5.0)  g/dL


 


Globulin    


 


Albumin/Globulin Ratio    


 


Urine Color    (YELLOW)  


 


Urine Appearance    (CLEAR)  


 


Urine pH    (5.0-9.0)  


 


Ur Specific Gravity    (1.005-1.030)  


 


Urine Protein    (NEGATIVE)  


 


Urine Glucose (UA)    (NEGATIVE)  


 


Urine Ketones    (NEGATIVE)  


 


Urine Occult Blood    (NEGATIVE)  


 


Urine Nitrite    (NEGATIVE)  


 


Urine Bilirubin    (NEGATIVE)  


 


Urine Urobilinogen    (0.2-1.0)  mg/dL


 


Ur Leukocyte Esterase    (NEGATIVE)  


 


Urine RBC    /HPF


 


Urine WBC    (0-5/HPF)  /HPF


 


Ur Epithelial Cells    (NOT SEEN)  /HPF


 


Urine Bacteria    (0-FEW/HPF)  /HPF


 


Urine Mucus    (NOT SEEN)  /LPF


 


Urine Opiates Screen  Positive H   (NEGATIVE)  


 


Ur Oxycodone Screen  Negative   (NEGATIVE)  


 


Urine Methadone Screen  Negative   (NEGATIVE)  


 


Ur Barbiturates Screen  Negative   (NEGATIVE)  


 


U Tricyclic Antidepress  Negative   (NEGATIVE)  


 


Ur Phencyclidine Scrn  Negative   (NEGATIVE)  


 


Ur Amphetamine Screen  Negative   (NEGATIVE)  


 


U Methamphetamines Scrn  Negative   (NEGATIVE)  


 


Urine MDMA Screen  Negative   (NEGATIVE)  


 


U Benzodiazepines Scrn  Negative   (NEGATIVE)  


 


Urine Cocaine Screen  Negative   (NEGATIVE)  


 


U Marijuana (THC) Screen  Negative   (NEGATIVE)  


 


Ethyl Alcohol    (0)  mg/dL


 


SARS-CoV-2 RNA ([facility])    (NEGATIVE)  











Med Orders - Current: 


                               Current Medications





Acetaminophen (Acetaminophen 325 Mg Tab)  650 mg PO Q4H PRN


   PRN Reason: Pain (Mild 1-3)/fever


Colchicine (Colchicine 0.6 Mg Tab)  1.2 mg PO DAILY [facility]


   Last Admin: 06/29/21 11:24 Dose:  1.2 mg


   Documented by: 


Enoxaparin Sodium (Enoxaparin 40 Mg/0.4 Ml Syringe)  40 mg SUBCUT DAILY [facility]


Sodium Chloride (Normal Saline)  1,000 mls @ 100 mls/hr IV ASDIRECTED [facility]


   Last Admin: 06/30/21 07:13 Dose:  100 mls/hr


   Documented by: 


Indomethacin (Indomethacin 25 Mg Cap)  50 mg PO TIDMEALS [facility]


   Last Admin: 06/29/21 17:12 Dose:  50 mg


   Documented by: 


Miscellaneous Information (Remove Nicotine Patch)  1 ea TRDERM DAILY [facility]


Nicotine (Nicotine 21 Mg/24 Hr Patch)  21 mg TRDERM DAILY PRN


   PRN Reason: Smoking Cessation


   Last Admin: 06/29/21 17:11 Dose:  21 mg


   Documented by: 


Ondansetron HCl (Ondansetron 4 Mg/2 Ml Sdv)  4 mg IVPUSH Q4H PRN


   PRN Reason: Nausea/Vomiting


Oxycodone/Acetaminophen (Acetaminophen/Oxycodone 325-5 Mg Tab)  1 tab PO Q4H PRN


   PRN Reason: Pain (moderate 4-6)


   Last Admin: 06/29/21 21:17 Dose:  1 tab


   Documented by: 


Pantoprazole Sodium (Pantoprazole 40 Mg Tab.Cr)  40 mg PO ACBREAKFAST [facility]


   Last Admin: 06/30/21 06:04 Dose:  40 mg


   Documented by: 


Prednisone (Prednisone 20 Mg Tab)  60 mg PO WITHBREAKFAST [facility]


   Last Admin: 06/29/21 11:24 Dose:  60 mg


   Documented by: 


Senna/Docusate Sodium (Docusate Sodium/Sennosides 50-8.6 Mg Tab)  2 tab PO BID 

PRN


   PRN Reason: Constipation


   Last Admin: 06/29/21 21:17 Dose:  2 tab


   Documented by: 


Sodium Chloride (Sodium Chloride 0.9% 10 Ml Syringe)  10 ml FLUSH ASDIRECTED PRN


   PRN Reason: Keep Vein Open





Discontinued Medications





Hydromorphone HCl (Hydromorphone 1 Mg/Ml Syringe)  1 mg IVPUSH ONETIME ONE


   Stop: 06/29/21 08:12


   Last Admin: 06/29/21 08:21 Dose:  1 mg


   Documented by: 


Sodium Chloride (Normal Saline)  1,000 mls @ 999 mls/hr IV .BOLUS ONE


   Stop: 06/29/21 08:18


   Last Admin: 06/29/21 07:24 Dose:  999 mls/hr


   Documented by: 


Pantoprazole Sodium 40 mg/ (Sodium Chloride)  100 mls @ 200 mls/hr IV ONETIME 

ONE


   Stop: 06/29/21 08:41


   Last Admin: 06/29/21 08:21 Dose:  200 mls/hr


   Documented by: 


Ketorolac Tromethamine (Ketorolac 30 Mg/Ml Sdv)  30 mg IVPUSH ONETIME ONE


   Stop: 06/29/21 07:33


   Last Admin: 06/29/21 07:40 Dose:  30 mg


   Documented by: 


Methylprednisolone Sodium Succinate (Methylprednisolone Sodium Succinate 125 

Mg/2 Ml Sdv)  125 mg IVPUSH ONETIME ONE


   Stop: 06/29/21 08:12


   Last Admin: 06/29/21 08:21 Dose:  125 mg


   Documented by: 











- Exam


General: Reports: Alert, Oriented


HEENT: Reports: Pupils Equal, Pupils Reactive, EOMI, Mucous Membr. Moist/Pink


Neck: Reports: Supple


Lungs: Reports: Clear to Auscultation, Normal Respiratory Effort


Cardiovascular: Reports: Regular Rate, Regular Rhythm


GI/Abdominal Exam: Normal Bowel Sounds, Soft, Non-Tender, No Organomegaly, No 

Distention, No Abnormal Bruit, No Mass, Pelvis Stable


Back Exam: Reports: Normal Inspection, Full Range of Motion


Extremities: Normal Inspection, Normal Range of Motion, Non-Tender, No Pedal 

Edema, Normal Capillary Refill


Skin: Reports: Warm, Dry, Intact


Wound/Incisions: Reports: Healing Well


Neurological: Reports: No New Focal Deficit


Psy/Mental Status: Reports: Alert, Normal Affect, Normal Mood

## 2021-10-13 ENCOUNTER — HOSPITAL ENCOUNTER (EMERGENCY)
Dept: HOSPITAL 43 - DL.ED | Age: 61
Discharge: HOME | End: 2021-10-13
Payer: COMMERCIAL

## 2021-10-13 VITALS — DIASTOLIC BLOOD PRESSURE: 92 MMHG | SYSTOLIC BLOOD PRESSURE: 138 MMHG | HEART RATE: 96 BPM

## 2021-10-13 DIAGNOSIS — M25.461: Primary | ICD-10-CM

## 2021-10-13 DIAGNOSIS — Z72.0: ICD-10-CM

## 2021-10-13 DIAGNOSIS — J44.9: ICD-10-CM

## 2021-10-13 DIAGNOSIS — Z79.899: ICD-10-CM

## 2021-10-13 DIAGNOSIS — M10.09: ICD-10-CM

## 2021-10-13 LAB
ANION GAP SERPL CALC-SCNC: 17.9 MEQ/L (ref 7–13)
CHLORIDE SERPL-SCNC: 96 MMOL/L (ref 98–107)
SODIUM SERPL-SCNC: 135 MMOL/L (ref 136–145)

## 2021-10-13 PROCEDURE — 85651 RBC SED RATE NONAUTOMATED: CPT

## 2021-10-13 PROCEDURE — 73562 X-RAY EXAM OF KNEE 3: CPT

## 2021-10-13 PROCEDURE — 86140 C-REACTIVE PROTEIN: CPT

## 2021-10-13 PROCEDURE — 36415 COLL VENOUS BLD VENIPUNCTURE: CPT

## 2021-10-13 PROCEDURE — 84550 ASSAY OF BLOOD/URIC ACID: CPT

## 2021-10-13 PROCEDURE — 85025 COMPLETE CBC W/AUTO DIFF WBC: CPT

## 2021-10-13 PROCEDURE — 99283 EMERGENCY DEPT VISIT LOW MDM: CPT

## 2021-10-13 PROCEDURE — 80053 COMPREHEN METABOLIC PANEL: CPT

## 2021-10-13 RX ADMIN — HYDROCODONE BITARTRATE AND ACETAMINOPHEN ONE TAB: 5; 325 TABLET ORAL at 14:30

## 2021-10-13 NOTE — EDM.PDOC
ED HPI GENERAL MEDICAL PROBLEM





- General


Chief Complaint: Lower Extremity Injury/Pain


Stated Complaint: CHRONIC PAIN RIGHT KNEE


Time Seen by Provider: 10/13/21 13:00


Source of Information: Reports: Patient, Old Records, RN, RN Notes Reviewed


History Limitations: Reports: No Limitations





- History of Present Illness


INITIAL COMMENTS - FREE TEXT/NARRATIVE: 


Marshall is a 60 y/o male who presents to the ED via personal vehicle with 

complaints of right knee pain.  The patient reports the pain has waxed and waned

in severity for several months.  He has a history of gout, but the patient felt 

his pain was not related to gouty arthritis so he stopped taking his 

indomethacin two days ago.  His last dose of pain medication was Advil 400mg two

days ago.  He denies history of injury or falls to the affected extremity.  He 

notes a decrease in range of motion to the joint but denies loss of sensory 

function.  





  ** Right Knee


Pain Score (Numeric/FACES): 8





- Related Data


                                    Allergies











Allergy/AdvReac Type Severity Reaction Status Date / Time


 


No Known Allergies Allergy   Verified 06/29/21 07:05











Home Meds: 


                                    Home Meds





Acetaminophen/HYDROcodone [Norco 325-10 MG] 1 tab PO DAILY PRN 06/29/21 

[History]


Acetaminophen [Tylenol] 650 mg PO Q4H PRN  tablet 06/30/21 [Rx]


Colchicine 0.6 mg PO DAILY 30 Days #30 capsule 06/30/21 [Rx]


Docusate Sodium/Sennosides [Senna Plus] 2 tab PO BID PRN 30 Days #30 tablet 

06/30/21 [Rx]


Indomethacin [Indocin] 50 mg PO TIDMEALS PRN 30 Days #60 cap 06/30/21 [Rx]


Pantoprazole [ProTONIX***] 40 mg PO ACBREAKFAST 15 Days #15 tab.cr 06/30/21 [Rx]


predniSONE [Prednisone] 10 mg PO DAILY 1 Days #1 tablet 06/30/21 [Rx]











Past Medical History





- Past Health History


Medical/Surgical History: Denies Medical/Surgical History


HEENT History: Reports: Impaired Vision


Cardiovascular History: Reports: None


Respiratory History: Reports: COPD


Gastrointestinal History: Reports: Other (See Below)


Other Gastrointestinal History: hepatitis C


Genitourinary History: Reports: None


Musculoskeletal History: Reports: Arthritis, Back Pain, Chronic, Gout


Neurological History: Reports: None


Psychiatric History: Reports: None


Endocrine/Metabolic History: Reports: None


Hematologic History: Reports: None


Immunologic History: Reports: None


Oncologic (Cancer) History: Reports: None


Dermatologic History: Reports: None





- Infectious Disease History


Infectious Disease History: Reports: Hepatitis C





- Past Surgical History


Head Surgeries/Procedures: Reports: None


Cardiovascular Surgical History: Reports: None


Respiratory Surgical History: Reports: None


GI Surgical History: Reports: None


Male  Surgical History: Reports: None


Endocrine Surgical History: Reports: None


Oncologic Surgical History: Reports: None


Dermatological Surgical History: Reports: None





Social & Family History





- Family History


Family Medical History: No Pertinent Family History





- Tobacco Use


Tobacco Use Status *Q: Current Every Day Tobacco User


Years of Tobacco use: 50


Packs/Tins Daily: 1.5





- Caffeine Use


Caffeine Use: Reports: None





- Recreational Drug Use


Recreational Drug Use: No





- Living Situation & Occupation


Living situation: Reports: with Family


Occupation: Disabled





Review of Systems





- Review of Systems


Review Of Systems: Comprehensive ROS is negative, except as noted in HPI.





ED EXAM, GENERAL





- Physical Exam


Exam: See Below


Exam Limited By: No Limitations


General Appearance: Alert, No Apparent Distress


Eye Exam: Bilateral Eye: EOMI, Normal Inspection, PERRL (3mm)


Ears: Normal External Exam, Hearing Grossly Normal


Nose: Normal Inspection, Normal Mucosa, No Blood


Throat/Mouth: Normal Oropharynx, Normal Voice, No Airway Compromise


Head: Atraumatic, Normocephalic


Neck: Normal Inspection, Supple, Non-Tender, Full Range of Motion


Respiratory/Chest: No Respiratory Distress, Lungs Clear, Normal Breath Sounds, 

No Accessory Muscle Use, Chest Non-Tender


Cardiovascular: Normal Peripheral Pulses, Regular Rate, Rhythm, No Gallop, No 

Murmur, No Rub


Peripheral Pulses: 1+: Posterior Tibial (L), Posterior Tibial (R), 2+: Radial 

(L), Radial (R), Dorsalis Pedis (L), 3+: Dorsalis Pedis (R)


GI/Abdominal: Normal Bowel Sounds, Soft, Non-Tender, No Distention, No Abnormal 

Bruit, No Mass, Pelvis Stable


 (Male) Exam: Deferred


Rectal (Males) Exam: Deferred


Back Exam: Normal Inspection, Full Range of Motion


Extremities: Normal Inspection, No Pedal Edema, Normal Capillary Refill, Leg 

Pain (To right posterior knee), Limited Range of Motion (To right knee).  No: 

Joint Swelling, Increased Warmth, Mottled, Pallor, Redness


Neurological: Alert, Oriented, CN II-XII Intact, Normal Cognition, Normal Gait, 

Normal Reflexes, No Motor/Sensory Deficits


Psychiatric: Normal Affect, Normal Mood


Skin Exam: Warm, Dry, Intact, Normal Color, No Rash.  No: Cyanosis, Ecchymosis, 

Erythema, Jaundice, Mottled, Pallor, Petechiae





Course





- Vital Signs


Last Recorded V/S: 


                                Last Vital Signs











Temp  97.4 F   10/13/21 12:47


 


Pulse  96   10/13/21 12:47


 


Resp  18   10/13/21 12:47


 


BP  138/92 H  10/13/21 12:47


 


Pulse Ox  95   10/13/21 12:47














- Orders/Labs/Meds


Labs: 


                                Laboratory Tests











  10/13/21 10/13/21 10/13/21 Range/Units





  13:19 13:19 13:19 


 


WBC  11.5 H    (5.0-10.0)  10^3/uL


 


RBC  4.81    (4.6-6.2)  10^6/uL


 


Hgb  16.6  D    (14.0-18.0)  g/dL


 


Hct  48.9    (40.0-54.0)  %


 


MCV  101.7 H D    ()  fL


 


MCH  34.5 H    (27.0-34.0)  pg


 


MCHC  33.9    (33.0-35.0)  g/dL


 


Plt Count  263    (150-450)  10^3/uL


 


Neut % (Auto)  69.4    (42.2-75.2)  %


 


Lymph % (Auto)  15.9 L    (20.5-50.1)  %


 


Mono % (Auto)  13.7 H    (2-8)  %


 


Eos % (Auto)  0.3 L    (1.0-3.0)  %


 


Baso % (Auto)  0.7    (0.0-1.0)  %


 


ESR    17 H  (0-15)  mm/hr


 


Sodium   135 L   (136-145)  mmol/L


 


Potassium   3.9   (3.5-5.1)  mmol/L


 


Chloride   96 L   ()  mmol/L


 


Carbon Dioxide   25   (21-32)  mmol/L


 


Anion Gap   17.9 H   (7-13)  mEq/L


 


BUN   12   (7-18)  mg/dL


 


Creatinine   1.26   (0.70-1.30)  mg/dL


 


Est Cr Clr Drug Dosing   67.57   mL/min


 


Estimated GFR (MDRD)   58   


 


BUN/Creatinine Ratio   9.5   (No establ ref range)  


 


Glucose   158 H   (70-99)  mg/dL


 


Uric Acid   8.1 H   (3.5-7.2)  mg/dL


 


Calcium   9.0   (8.5-10.1)  mg/dL


 


Total Bilirubin   1.2 H   (0.2-1.0)  mg/dL


 


AST   29   (15-37)  U/L


 


ALT   37   (16-63)  U/L


 


Alkaline Phosphatase   108   ()  U/L


 


C-Reactive Protein   8.8 H   (0.0-0.9)  mg/dL


 


Total Protein   8.2   (6.4-8.2)  g/dL


 


Albumin   3.5   (3.4-5.0)  g/dL


 


Globulin   4.7   


 


Albumin/Globulin Ratio   0.7   











Meds: 


Medications














Discontinued Medications














Generic Name Dose Route Start Last Admin





  Trade Name Freq  PRN Reason Stop Dose Admin


 


Hydrocodone Bitart/Acetaminophen  1 tab  10/13/21 14:25  10/13/21 14:30





  Acetaminophen/Hydrocodone 325-5 Mg Tab  PO  10/13/21 14:26  1 tab





  ONETIME ONE   Administration


 


Colchicine  0.6 mg  10/13/21 14:25  10/13/21 14:30





  Colchicine 0.6 Mg Tab  PO  10/13/21 14:26  0.6 mg





  ONETIME ONE   Administration


 


Ibuprofen  400 mg  10/13/21 13:24  10/13/21 13:31





  Ibuprofen 400 Mg Tab  PO  10/13/21 13:25  400 mg





  ONETIME ONE   Administration


 


Oxycodone/Acetaminophen  1 tab  10/13/21 14:22 





  Acetaminophen/Oxycodone 325-5 Mg Tab  PO  10/13/21 14:23 





  ONETIME ONE  














- Re-Assessments/Exams


Free Text/Narrative Re-Assessment/Exam: 





10/13/21


Xray right knee obtained given lack of imaging while reviewing records.  

Ibuprofen 400mg PO administered. 





Patient verbalized no improvement in pain following medication.  Will administer

 Norco 5-325mg.





Findings of examination and imaging reviewed with patient.  Will treat acute 

pain with Norco.  Patient instructed to follow up with primary care provider in 

1-2 to discuss restarting home anti-gout medications.  Red flag signs and 

symptoms which would warrant immediate reevaluation reviewed.  Patient 

verbalized understanding and agreement with the plan of care.  








Departure





- Departure


Time of Disposition: 15:12


Disposition: Home, Self-Care 01


Condition: Good


Clinical Impression: 


Gout flare


Qualifiers:


 Gout site: multiple sites Gout etiology: idiopathic Qualified Code(s): M10.09 -

 Idiopathic gout, multiple sites





Effusion of bursa of knee


Qualifiers:


 Laterality: right Qualified Code(s): M25.461 - Effusion, right knee








- Discharge Information


*PRESCRIPTION DRUG MONITORING PROGRAM REVIEWED*: Not Applicable


*COPY OF PRESCRIPTION DRUG MONITORING REPORT IN PATIENT PITO: Not Applicable


Instructions:  Knee Effusion, Gout


Forms:  ED Department Discharge


Additional Instructions: 


Rx: Norco





1.) Follow up with your primary care provider regarding medication management, 

including restarting Allopurinol and indomethacin. 


2.) You may take ibuprofen (Advil/Motrin) 400mg every six hours, as pain 

persists. 


3.) You may apply ice to the affected area as pain and swelling persist. 


4.) Return to the emergency department with persistent or worsening symptoms. 





Sepsis Event Note (ED)





- Evaluation


Sepsis Screening Result: No Definite Risk

## 2021-10-13 NOTE — CR
EXAMINATION: Knee 3V Rt  SEX: Male   AGE: 61 years

 

CLINICAL HISTORY: 61-year-old male with posterior right knee pain.

 

Interpretation: Small suprapatellar bursal effusion. Internal derangement?

 

No sign of Baker's cyst but ultrasound may prove helpful. 

Note: Atheromatous calcifications distal femoral artery, soft tissues, distal

right thigh.

 

Homogeneous normal bone mineral density. No pathologic skeletal lesion (lytic or

blastic).

No right knee fracture, dislocation or radiopaque loose joint body.

 

Symmetric normal spacing of the knee joint without appreciable arthritic

degenerative change. 

Mild reactive sclerosis and tiny marginal spur patellofemoral surface of the

right patella.

 

No foreign bodies.

## 2022-05-17 ENCOUNTER — HOSPITAL ENCOUNTER (EMERGENCY)
Dept: HOSPITAL 43 - DL.ED | Age: 62
Discharge: HOME | End: 2022-05-17
Payer: COMMERCIAL

## 2022-05-17 VITALS — HEART RATE: 66 BPM | SYSTOLIC BLOOD PRESSURE: 123 MMHG | DIASTOLIC BLOOD PRESSURE: 69 MMHG

## 2022-05-17 DIAGNOSIS — F17.210: ICD-10-CM

## 2022-05-17 DIAGNOSIS — J44.9: ICD-10-CM

## 2022-05-17 DIAGNOSIS — Z20.822: ICD-10-CM

## 2022-05-17 DIAGNOSIS — F10.10: ICD-10-CM

## 2022-05-17 DIAGNOSIS — I10: ICD-10-CM

## 2022-05-17 DIAGNOSIS — R07.89: Primary | ICD-10-CM

## 2022-05-17 LAB
ANION GAP SERPL CALC-SCNC: 13.3 MEQ/L (ref 7–13)
CHLORIDE SERPL-SCNC: 100 MMOL/L (ref 98–107)
SODIUM SERPL-SCNC: 135 MMOL/L (ref 136–145)

## 2022-05-17 PROCEDURE — U0002 COVID-19 LAB TEST NON-CDC: HCPCS

## 2022-05-26 ENCOUNTER — HOSPITAL ENCOUNTER (EMERGENCY)
Dept: HOSPITAL 43 - DL.ED | Age: 62
Discharge: HOME | End: 2022-05-26
Payer: COMMERCIAL

## 2022-05-26 VITALS — SYSTOLIC BLOOD PRESSURE: 164 MMHG | HEART RATE: 104 BPM | DIASTOLIC BLOOD PRESSURE: 96 MMHG

## 2022-05-26 DIAGNOSIS — I10: ICD-10-CM

## 2022-05-26 DIAGNOSIS — F17.210: ICD-10-CM

## 2022-05-26 DIAGNOSIS — B02.9: Primary | ICD-10-CM

## 2022-05-26 DIAGNOSIS — Z79.899: ICD-10-CM

## 2022-05-26 DIAGNOSIS — J44.9: ICD-10-CM
